# Patient Record
Sex: FEMALE | Race: WHITE | Employment: OTHER | URBAN - METROPOLITAN AREA
[De-identification: names, ages, dates, MRNs, and addresses within clinical notes are randomized per-mention and may not be internally consistent; named-entity substitution may affect disease eponyms.]

---

## 2017-02-09 ENCOUNTER — TRANSFERRED RECORDS (OUTPATIENT)
Dept: HEALTH INFORMATION MANAGEMENT | Facility: CLINIC | Age: 77
End: 2017-02-09

## 2017-02-14 ENCOUNTER — TELEPHONE (OUTPATIENT)
Dept: INTERNAL MEDICINE | Facility: CLINIC | Age: 77
End: 2017-02-14

## 2017-02-14 NOTE — TELEPHONE ENCOUNTER
Reason for Call:  question    Detailed comments: patient has questions regarding a stool sample she has to drop off.    Phone Number Patient can be reached at: Home number on file [phone] (home)    Best Time: anytime today    Can we leave a detailed message on this number? YES    Call taken on 2/14/2017 at 8:45 AM by Danielle Sanchez

## 2017-02-14 NOTE — TELEPHONE ENCOUNTER
"I called patient who reports she was seen in [facility] ER last Thursday for epigastric pressure, cardiac issue ruled out.   She was told she had gastroenteritis and has a stool cup for C.diff and wonders if she can bring sample if she is having diarrhea.  I advised her the sample has to be totally liquid.  She reports she only has loose stool off and on, usually when she eats out.    They put her on zantac and maalox in ER and she is feeling better.  No loose stool since last evening.  Denies abdominal pain or fever.  She states the off and on diarrhea has been going on \"for some time\".  Advised her she should follow up with [facility] to discuss her diagnosis and plan, hold off on stool sample as she is not having liquid stool.  Scheduled for Thursday.  Patient verbalized understanding of and agreement with plan.  Brigida Perez RN  [facility]      "

## 2017-02-16 ENCOUNTER — OFFICE VISIT (OUTPATIENT)
Dept: INTERNAL MEDICINE | Facility: CLINIC | Age: 77
End: 2017-02-16
Payer: MEDICARE

## 2017-02-16 VITALS
WEIGHT: 230 LBS | HEART RATE: 89 BPM | OXYGEN SATURATION: 94 % | BODY MASS INDEX: 34.86 KG/M2 | DIASTOLIC BLOOD PRESSURE: 72 MMHG | HEIGHT: 68 IN | SYSTOLIC BLOOD PRESSURE: 130 MMHG | TEMPERATURE: 97.1 F

## 2017-02-16 DIAGNOSIS — D86.9 SARCOIDOSIS: ICD-10-CM

## 2017-02-16 DIAGNOSIS — E66.812 OBESITY, CLASS II, BMI 35-39.9: ICD-10-CM

## 2017-02-16 DIAGNOSIS — Z12.11 COLON CANCER SCREENING: ICD-10-CM

## 2017-02-16 DIAGNOSIS — K63.5 COLON POLYP: ICD-10-CM

## 2017-02-16 DIAGNOSIS — E11.9 DIABETES MELLITUS WITHOUT COMPLICATION (H): ICD-10-CM

## 2017-02-16 DIAGNOSIS — G40.109 LOCALIZATION-RELATED FOCAL EPILEPSY WITH SIMPLE PARTIAL SEIZURES (H): ICD-10-CM

## 2017-02-16 DIAGNOSIS — E11.42 DIABETIC POLYNEUROPATHY ASSOCIATED WITH TYPE 2 DIABETES MELLITUS (H): Primary | ICD-10-CM

## 2017-02-16 DIAGNOSIS — I10 ESSENTIAL HYPERTENSION WITH GOAL BLOOD PRESSURE LESS THAN 140/90: Chronic | ICD-10-CM

## 2017-02-16 PROCEDURE — 99214 OFFICE O/P EST MOD 30 MIN: CPT | Performed by: INTERNAL MEDICINE

## 2017-02-16 ASSESSMENT — PAIN SCALES - GENERAL: PAINLEVEL: NO PAIN (0)

## 2017-02-16 NOTE — MR AVS SNAPSHOT
"              After Visit Summary   2/16/2017    Phyllis Aleman    MRN: [id number]           Patient Information     Date Of Birth          1940        Visit Information        Provider Department      2/16/2017 3:00 PM Johana Pelayo MD [facility]        Today's Diagnoses     Bowel habit changes    -  1    Colon polyp        overweight  HCC        Localization-related focal epilepsy with simple partial seizures (H)        Essential hypertension with goal blood pressure less than 140/90        Sarcoidosis quiet        Diabetes mellitus without complication (H)          Care Instructions    Return to clinic early/mid May 2017 with labs and mammogram prior              Follow-ups after your visit        Follow-up notes from your care team     Return in about 3 months (around 5/16/2017) for Routine Visit.      Who to contact     If you have questions or need follow up information about today's clinic visit or your schedule please contact [facility] directly at [phone].  Normal or non-critical lab and imaging results will be communicated to you by MyChart, letter or phone within 4 business days after the clinic has received the results. If you do not hear from us within 7 days, please contact the clinic through MyChart or phone. If you have a critical or abnormal lab result, we will notify you by phone as soon as possible.  Submit refill requests through [employer] or call your pharmacy and they will forward the refill request to us. Please allow 3 business days for your refill to be completed.          Additional Information About Your Visit        MyChart Information     [employer] lets you send messages to your doctor, view your test results, renew your prescriptions, schedule appointments and more. To sign up, go to [email or web address] . Click on \"Log in\" on the left side of the screen, which will take you to the Welcome page. Then click on \"Sign up " "Now\" on the right side of the page.     You will be asked to enter the access code listed below, as well as some personal information. Please follow the directions to create your username and password.     Your access code is: [email or web address]  Expires: 2017  2:37 PM     Your access code will  in 90 days. If you need help or a new code, please call your [place] clinic or [phone].        Care EveryWhere ID     This is your Care EveryWhere ID. This could be used by other organizations to access your [place] medical records  [id number]        Your Vitals Were     Pulse Temperature Height Pulse Oximetry Breastfeeding? BMI (Body Mass Index)    89 97.1  F (36.2  C) (Oral) 5' 7.5\" (1.715 m) 94% No 35.49 kg/m2       Blood Pressure from Last 3 Encounters:   17 130/72   16 125/67   16 136/67    Weight from Last 3 Encounters:   17 230 lb (104.3 kg)   16 232 lb (105.2 kg)   16 231 lb (104.8 kg)              Today, you had the following     No orders found for display       Primary Care Provider Office Phone # Fax #    Johana Pelayo [phone]       [facility] [address] [facility] [address]        Thank you!     Thank you for choosing [facility]  for your care. Our goal is always to provide you with excellent care. Hearing back from our patients is one way we can continue to improve our services. Please take a few minutes to complete the written survey that you may receive in the mail after your visit with us. Thank you!             Your Updated Medication List - Protect others around you: Learn how to safely use, store and throw away your medicines at www.disposemymeds.org.          This list is accurate as of: 17  3:43 PM.  Always use your most recent med list.                   Brand Name Dispense Instructions for use    aspirin 81 MG tablet      Take 1 tablet by mouth daily.       blood " glucose monitoring lancets     1 Box    Use to test blood sugar 1 times daily or as directed.       blood glucose monitoring test strip    ACCU-CHEK ANA PLUS    1 Box    Use to test blood sugar 1 times daily or as directed.       CENTRUM SILVER ULTRA WOMENS PO     30    Take  by mouth daily.       fish oil-omega-3 fatty acids 1000 MG capsule      Take 2 g by mouth every other day Reported on 2/16/2017       furosemide 40 MG tablet    LASIX    90 tablet    Take 1 tablet (40 mg) by mouth daily       lamoTRIgine 100 MG tablet    LaMICtal    180 tablet    Take 1 tablet (100 mg) by mouth 2 times daily       losartan 50 MG tablet    COZAAR    180 tablet    Take 1 tablet (50 mg) by mouth 2 times daily       metFORMIN 500 MG tablet    GLUCOPHAGE    270 tablet    Take 1 tablet (500 mg) by mouth 3 times daily (with meals) .       potassium chloride SA 20 MEQ CR tablet    K-DUR/KLOR-CON M    90 tablet    Take 1 tablet (20 mEq) by mouth daily Patient would like the K DUR, not the Klor Con due to cost!       simvastatin 20 MG tablet    ZOCOR    90 tablet    Take 1 tablet (20 mg) by mouth At Bedtime       ZANTAC 150 MG tablet   Generic drug:  ranitidine      Take  by mouth. As needed

## 2017-02-16 NOTE — NURSING NOTE
"Chief Complaint   Patient presents with     Hospital F/U     Diarrhea     Health Maintenance     colonoscopy        Initial /72 (BP Location: Right arm, Patient Position: Right side, Cuff Size: Adult Large)  Pulse 89  Temp 97.1  F (36.2  C) (Oral)  Ht 5' 7.5\" (1.715 m)  Wt 230 lb (104.3 kg)  SpO2 94%  Breastfeeding? No  BMI 35.49 kg/m2 Estimated body mass index is 35.49 kg/(m^2) as calculated from the following:    Height as of this encounter: 5' 7.5\" (1.715 m).    Weight as of this encounter: 230 lb (104.3 kg).  Medication Reconciliation: SMA Jeanette Gentile MA    "

## 2017-03-03 ENCOUNTER — TELEPHONE (OUTPATIENT)
Dept: INTERNAL MEDICINE | Facility: CLINIC | Age: 77
End: 2017-03-03

## 2017-03-03 NOTE — TELEPHONE ENCOUNTER
Panel Management Review      Patient has the following on her problem list:     Diabetes    ASA: Passed    Last A1C  Lab Results   Component Value Date    A1C 6.6 11/02/2016    A1C 6.6 06/28/2016    A1C 6.7 04/12/2016    A1C 6.9 10/20/2015    A1C 7.1 04/24/2015     A1C tested: Passed    Last LDL:    Lab Results   Component Value Date    CHOL 161 04/12/2016     Lab Results   Component Value Date    HDL 64 04/12/2016     Lab Results   Component Value Date    LDL 79 04/12/2016     Lab Results   Component Value Date    TRIG 91 04/12/2016     Lab Results   Component Value Date    CHOLHDLRATIO 2.7 04/24/2015     Lab Results   Component Value Date    NHDL 97 04/12/2016       Is the patient on a Statin? NO             Is the patient on Aspirin? YES    Medications     HMG CoA Reductase Inhibitors    simvastatin (ZOCOR) 20 MG tablet    Salicylates    aspirin 81 MG tablet          Last three blood pressure readings:  BP Readings from Last 3 Encounters:   02/16/17 130/72   11/29/16 125/67   06/28/16 136/67       Date of last diabetes office visit: 2/16/17     Tobacco History:     History   Smoking Status     Never Smoker   Smokeless Tobacco     Never Used         Hypertension   Last three blood pressure readings:  BP Readings from Last 3 Encounters:   02/16/17 130/72   11/29/16 125/67   06/28/16 136/67     Blood pressure: Passed    HTN Guidelines:  Age 18-59 BP range:  Less than 140/90  Age 60-85 with Diabetes:  Less than 140/90  Age 60-85 without Diabetes:  less than 150/90      Composite cancer screening  Chart review shows that this patient is due/due soon for the following Colonoscopy  Summary:    Patient is due/failing the following:   COLONOSCOPY    Action needed:   Referral was made on 2/16/17 for [place] [facility]    Type of outreach:    none closing chart    Questions for provider review:    None                                                                                                                                     Jeanette Holly ma       Chart routed to closing chart .

## 2017-03-28 DIAGNOSIS — G40.109 LOCALIZATION-RELATED FOCAL EPILEPSY WITH SIMPLE PARTIAL SEIZURES (H): ICD-10-CM

## 2017-03-28 NOTE — TELEPHONE ENCOUNTER
lamoTRIgine (LAMICTAL) 100 MG tablet      Last Written Prescription Date:  04/06/16  Last Fill Quantity: 180,   # refills: 3  Last Office Visit with [facility], [facility] or [facility] prescribing provider: 02/16/17  Future Office visit:    Next 5 appointments (look out 90 days)     Apr 19, 2017 10:00 AM CDT   Return Visit with Gen Whitaker MD   [facility] ([address] [address]   [phone]                   Routing refill request to provider for review/approval because:  Drug not on the [facility], [facility] or [facility] refill protocol or controlled substance

## 2017-03-31 RX ORDER — LAMOTRIGINE 100 MG/1
100 TABLET ORAL 2 TIMES DAILY
Qty: 60 TABLET | Refills: 0 | Status: SHIPPED | OUTPATIENT
Start: 2017-03-31 | End: 2017-04-19

## 2017-03-31 NOTE — TELEPHONE ENCOUNTER
Signed Prescriptions:                        Disp   Refills    lamoTRIgine (LAMICTAL) 100 MG tablet       60 tab*0        Sig: Take 1 tablet (100 mg) by mouth 2 times daily  Authorizing Provider: RERE KING  Ordering User: BETINA QUINTERO RN

## 2017-04-19 ENCOUNTER — OFFICE VISIT (OUTPATIENT)
Dept: NEUROLOGY | Facility: CLINIC | Age: 77
End: 2017-04-19
Payer: MEDICARE

## 2017-04-19 VITALS
WEIGHT: 227.4 LBS | SYSTOLIC BLOOD PRESSURE: 130 MMHG | HEART RATE: 83 BPM | DIASTOLIC BLOOD PRESSURE: 70 MMHG | HEIGHT: 68 IN | BODY MASS INDEX: 34.46 KG/M2

## 2017-04-19 DIAGNOSIS — G40.109 LOCALIZATION-RELATED FOCAL EPILEPSY WITH SIMPLE PARTIAL SEIZURES (H): Primary | ICD-10-CM

## 2017-04-19 PROCEDURE — 99213 OFFICE O/P EST LOW 20 MIN: CPT | Performed by: PSYCHIATRY & NEUROLOGY

## 2017-04-19 RX ORDER — LAMOTRIGINE 100 MG/1
100 TABLET ORAL 2 TIMES DAILY
Qty: 180 TABLET | Refills: 3 | Status: SHIPPED | OUTPATIENT
Start: 2017-04-19

## 2017-04-19 NOTE — MR AVS SNAPSHOT
After Visit Summary   4/19/2017    Phyllis Aleman    MRN: [id number]           Patient Information     Date Of Birth          1940        Visit Information        Provider Department      4/19/2017 10:00 AM Gen Whitaker MD [facility]        Today's Diagnoses     Localization-related focal epilepsy with simple partial seizures (H)    -  1    Localization-related focal epilepsy with simple partial seizures          Care Instructions      Future blood test before next appointment in 2018  Orders Placed This Encounter   Procedures     Lamotrigine level       *Please call [facility] near your home to have morning appointment for the blood draw.   Blood sample for seizure medication (TROUGH LEVEL), before the 1st dose in the morning                    Recent blood test results reviewed today   Reminded again:  To follow the seizure precautions including driving restrictions as per [place] Law including- to report a seizure and date it occurred within thirty (30) days of the episode or event     Importance of compliance with anti-seizure medication (s).      Documenting the frequency of the seizures                 To go to local hospital ER for acute emergencies/seizure management  and arrange office follow-up for further review of medications    To call us for follow-up appointment in next 1 year(s) or earlier if needed.    Thanks        Follow-ups after your visit        Follow-up notes from your care team     Return in about 1 year (around 4/19/2018).      Future tests that were ordered for you today     Open Future Orders        Priority Expected Expires Ordered    Lamotrigine level Routine 3/1/2018 4/19/2019 4/19/2017            Who to contact     If you have questions or need follow up information about today's clinic visit or your schedule please contact [facility] directly at [phone].  Normal or non-critical lab and imaging results will be  "communicated to you by [employer], letter or phone within 4 business days after the clinic has received the results. If you do not hear from us within 7 days, please contact the clinic through [employer] or phone. If you have a critical or abnormal lab result, we will notify you by phone as soon as possible.  Submit refill requests through [employer] or call your pharmacy and they will forward the refill request to us. Please allow 3 business days for your refill to be completed.          Additional Information About Your Visit        PrepClass Information     [employer] lets you send messages to your doctor, view your test results, renew your prescriptions, schedule appointments and more. To sign up, go to [email or web address] . Click on \"Log in\" on the left side of the screen, which will take you to the Welcome page. Then click on \"Sign up Now\" on the right side of the page.     You will be asked to enter the access code listed below, as well as some personal information. Please follow the directions to create your username and password.     Your access code is: [email or web address]  Expires: 2017 11:15 AM     Your access code will  in 90 days. If you need help or a new code, please call your [place] clinic or [phone].        Care EveryWhere ID     This is your Care EveryWhere ID. This could be used by other organizations to access your [place] medical records  [id number]        Your Vitals Were     Pulse Height BMI (Body Mass Index)             83 1.715 m (5' 7.5\") 35.09 kg/m2          Blood Pressure from Last 3 Encounters:   17 130/70   17 130/72   16 125/67    Weight from Last 3 Encounters:   17 103.1 kg (227 lb 6.4 oz)   17 104.3 kg (230 lb)   16 105.2 kg (232 lb)                 Where to get your medicines      These medications were sent to [facility] [id number] - [place], [place] - [address] AT [facility] [address]  [address] [place] [address]     Phone:  [phone]     lamoTRIgine 100 MG tablet          Primary Care Provider Office Phone # Fax #    Johana Pelayo -[phone]       [facility] [address]  [facility] [address]        Thank you!     Thank you for choosing [facility]  for your care. Our goal is always to provide you with excellent care. Hearing back from our patients is one way we can continue to improve our services. Please take a few minutes to complete the written survey that you may receive in the mail after your visit with us. Thank you!             Your Updated Medication List - Protect others around you: Learn how to safely use, store and throw away your medicines at www.disposemymeds.org.          This list is accurate as of: 4/19/17 11:15 AM.  Always use your most recent med list.                   Brand Name Dispense Instructions for use    aspirin 81 MG tablet      Take 1 tablet by mouth daily.       blood glucose monitoring lancets     1 Box    Use to test blood sugar 1 times daily or as directed.       blood glucose monitoring test strip    ACCU-CHEK ANA PLUS    1 Box    Use to test blood sugar 1 times daily or as directed.       CENTRUM SILVER ULTRA WOMENS PO     30    Take  by mouth daily.       fish oil-omega-3 fatty acids 1000 MG capsule      Take 2 g by mouth every other day Reported on 2/16/2017       furosemide 40 MG tablet    LASIX    90 tablet    Take 1 tablet (40 mg) by mouth daily       lamoTRIgine 100 MG tablet    LaMICtal    180 tablet    Take 1 tablet (100 mg) by mouth 2 times daily       losartan 50 MG tablet    COZAAR    180 tablet    Take 1 tablet (50 mg) by mouth 2 times daily       metFORMIN 500 MG tablet    GLUCOPHAGE    270 tablet    Take 1 tablet (500 mg) by mouth 3 times daily (with meals) .       potassium chloride SA 20 MEQ CR tablet    K-DUR/KLOR-CON M    90 tablet    Take 1 tablet (20 mEq) by mouth daily Patient  would like the SUREKHA GILLIS, not the Klor Con due to cost!       simvastatin 20 MG tablet    ZOCOR    90 tablet    Take 1 tablet (20 mg) by mouth At Bedtime       ZANTAC 150 MG tablet   Generic drug:  ranitidine      Take  by mouth. As needed

## 2017-04-19 NOTE — PATIENT INSTRUCTIONS
Future blood test before next appointment in 2018  Orders Placed This Encounter   Procedures     Lamotrigine level       *Please call [facility] near your home to have morning appointment for the blood draw.   Blood sample for seizure medication (TROUGH LEVEL), before the 1st dose in the morning                    Recent blood test results reviewed today   Reminded again:  To follow the seizure precautions including driving restrictions as per [place] Law including- to report a seizure and date it occurred within thirty (30) days of the episode or event     Importance of compliance with anti-seizure medication (s).      Documenting the frequency of the seizures                 To go to local hospital ER for acute emergencies/seizure management  and arrange office follow-up for further review of medications    To call us for follow-up appointment in next 1 year(s) or earlier if needed.    Thanks

## 2017-04-19 NOTE — NURSING NOTE
"Chief Complaint   Patient presents with     RECHECK     Seizure       Initial /70 (BP Location: Right arm, Patient Position: Chair, Cuff Size: Adult Large)  Pulse 83  Ht 5' 7.5\" (1.715 m)  Wt 227 lb 6.4 oz (103.1 kg)  BMI 35.09 kg/m2 Estimated body mass index is 35.09 kg/(m^2) as calculated from the following:    Height as of this encounter: 5' 7.5\" (1.715 m).    Weight as of this encounter: 227 lb 6.4 oz (103.1 kg).  Medication Reconciliation: complete   Deann Pal MA      "

## 2017-04-19 NOTE — PROGRESS NOTES
ESTABLISHED PATIENT NEUROLOGY NOTE    LOCATION: [facility]   DATE OF VISIT: 2017  NAME: Ms.Merry CORTES Aleman  : 1940 (76 year old)  MR #: [id number]    PRIMARY PROVIDER: Johana Pelayo MD    REASON FOR VISIT: Review of symptoms    HISTORY OF PRESENT ILLNESS: Ms. Phyllis Aleman is a 75-year-old woman with previous history of spells for more than 3 years.   Doing good on Lamotrigine.   Her last seizure was in .   No recurrence of seizures.  She has been compliant with lamotrigine.    Keeping physically and mentally active.   Blood tests:    Component      Latest Ref Rng & Units 2016   Lamotrigine Level       5.3     CURRENT MEDICATIONS:   Current Outpatient Prescriptions on File Prior to Visit:  lamoTRIgine (LAMICTAL) 100 MG tablet Take 1 tablet (100 mg) by mouth 2 times daily   potassium chloride SA (K-DUR,KLOR-CON M) 20 MEQ tablet Take 1 tablet (20 mEq) by mouth daily Patient would like the K DUR, not the Klor Con due to cost!   furosemide (LASIX) 40 MG tablet Take 1 tablet (40 mg) by mouth daily   losartan (COZAAR) 50 MG tablet Take 1 tablet (50 mg) by mouth 2 times daily   metFORMIN (GLUCOPHAGE) 500 MG tablet Take 1 tablet (500 mg) by mouth 3 times daily (with meals) .   blood glucose monitoring (ACCU-CHEK ANA PLUS) test strip Use to test blood sugar 1 times daily or as directed.   simvastatin (ZOCOR) 20 MG tablet Take 1 tablet (20 mg) by mouth At Bedtime   blood glucose monitoring (ACCU-CHEK FASTCLIX) lancets Use to test blood sugar 1 times daily or as directed.   ranitidine (ZANTAC) 150 MG tablet Take  by mouth. As needed   fish oil-omega-3 fatty acids (FISH OIL) 1000 MG capsule Take 2 g by mouth every other day Reported on 2017   aspirin 81 MG tablet Take 1 tablet by mouth daily.   Multiple Vitamins-Minerals (CENTRUM SILVER ULTRA WOMENS PO) Take  by mouth daily.     No current facility-administered medications on file prior  "to visit.   PAST MEDICAL HISTORY: Past Medical History:   Diagnosis Date     Arthritis      Cancer (H)      Cataract      Colon polyp 4/8/2010    Due for colonoscopy in 2011      Diabetes mellitus without complication (H) 10/24/2015     Diabetes mellitus without mention of complication      Dyslipidemia      Edema of extremities 5/12/2014     Endolymphatic hydrops 9/26/2012     Gallstones 2003     HTN      Hyperlipidemia LDL goal <100 11/29/2011     Localization-related focal epilepsy with simple partial seizures (H) 7/3/2014     Meniere's disease      Menopause      overweight 10/27/2015     PONV (postoperative nausea and vomiting)      Pseudophakia OU 10/18/2010     PVD (posterior vitreous detachment), bilateral 6/29/2015     Sarcoidosis (H) 1996    stable bilateral hilar adenopathy on yearly CXRs     Past Surgical, Personal & Social history reviewed & documented in the EPIC.    GENERAL EXAMINATION:  /70 (BP Location: Right arm, Patient Position: Chair, Cuff Size: Adult Large)  Pulse 83  Ht 1.715 m (5' 7.5\")  Wt 103.1 kg (227 lb 6.4 oz)  BMI 35.09 kg/m2    IMPRESSION:  Encounter Diagnoses   Name Primary?     Localization-related focal epilepsy with simple partial seizures (H) Yes   PLANS:   Patient Instructions       Future blood test before next appointment in 2018  Orders Placed This Encounter   Procedures     Lamotrigine level       *Please call [facility] near your home to have morning appointment for the blood draw.   Blood sample for seizure medication (TROUGH LEVEL), before the 1st dose in the morning                    Recent blood test results reviewed today   Reminded again:  To follow the seizure precautions including driving restrictions as per [place] Law including- to report a seizure and date it occurred within thirty (30) days of the episode or event     Importance of compliance with anti-seizure medication (s).      Documenting the frequency of the seizures                 To go to " local hospital ER for acute emergencies/seizure management  and arrange office follow-up for further review of medications    To call us for follow-up appointment in next 1 year(s) or earlier if needed.    Thanks to Johana Pelayo MD for allowing me to participate in Ms. Aleman's care. Please feel free to call me with any questions or concerns.         Gen Whitaker MD, [facility]  Neurologist    Cc: Johana Pelayo MD

## 2017-06-01 ENCOUNTER — TELEPHONE (OUTPATIENT)
Dept: FAMILY MEDICINE | Facility: CLINIC | Age: 77
End: 2017-06-01

## 2017-06-01 DIAGNOSIS — E11.9 DIABETES MELLITUS WITHOUT COMPLICATION (H): ICD-10-CM

## 2017-06-01 DIAGNOSIS — E78.5 HYPERLIPIDEMIA LDL GOAL <100: ICD-10-CM

## 2017-06-01 RX ORDER — SIMVASTATIN 20 MG
20 TABLET ORAL AT BEDTIME
Qty: 30 TABLET | Refills: 0 | Status: SHIPPED | OUTPATIENT
Start: 2017-06-01 | End: 2017-06-22

## 2017-06-01 NOTE — TELEPHONE ENCOUNTER
Patient last seen 2/16/17, see follow up plan:    Patient Instructions   Return to clinic early/mid May 2017 with labs and mammogram prior        Due for FLP.  Has future lab orders; I don't see order for mammogram, last done in 2015.    Medication is being filled for 1 time refill only due to:  Patient needs to be seen because due for fasting labs and visit per plan.     Brigida Perez RN  [facility]

## 2017-06-01 NOTE — TELEPHONE ENCOUNTER
Received call from [facility] stating that they have been incorrectly sending patient's refill request to the wrong provider. Patient is completely out of medication. Will route as patient calls to address refill request.  simvastatin (ZOCOR) 20 MG tablet     Last Written Prescription Date: 06/28/2016  Last Fill Quantity: 90, # refills: 3  Last Office Visit with [facility], [facility] or [facility] prescribing provider: 02/16/2017       Lab Results   Component Value Date    CHOL 161 04/12/2016     Lab Results   Component Value Date    HDL 64 04/12/2016     Lab Results   Component Value Date    LDL 79 04/12/2016     Lab Results   Component Value Date    TRIG 91 04/12/2016     Lab Results   Component Value Date    CHOLHDLRATIO 2.7 04/24/2015

## 2017-06-19 ENCOUNTER — TELEPHONE (OUTPATIENT)
Dept: NEUROLOGY | Facility: CLINIC | Age: 77
End: 2017-06-19

## 2017-06-19 NOTE — TELEPHONE ENCOUNTER
Reason for Call: Request for an order or referral:    Order or referral being requested: Patient requesting new lab order    Date needed: as soon as possible    Has the patient been seen by the PCP for this problem? YES    Additional comments: Na    Phone number Patient can be reached at:  Home number on file [phone] (home)    Best Time:  anytime    Can we leave a detailed message on this number?  YES    Call taken on 6/19/2017 at 3:26 PM by Payal Long

## 2017-06-20 NOTE — TELEPHONE ENCOUNTER
Called and left patient a message to call me back at clinic, if patient calls please philip me so, I can speak with her  Deann Pal MA

## 2017-06-20 NOTE — TELEPHONE ENCOUNTER
Patient called back and I explained lab orders have been entered, patient says she calls and was told lab order were no longer in the computer. I explained they are entered but, when she goes to have lab work done if she can remind them to release future orders.  Deann Pal MA

## 2017-06-21 NOTE — PATIENT INSTRUCTIONS

## 2017-06-21 NOTE — PROGRESS NOTES
SUBJECTIVE:                                                            Phyllis Aleamn is a 76 year old female who presents for Preventive Visit.    77 y/o WF with uncomplicated DM II, quiet sarcoidosis here for AFE.  Colonoscopy due, will do a FIT test though..  on Lamictal for seizures, and has had no symptoms.     Are you in the first 12 months of your Medicare Part B coverage?  No    Healthy Habits:    Do you get at least three servings of calcium containing foods daily (dairy, green leafy vegetables, etc.)? yes    Amount of exercise or daily activities, outside of work: 3 day(s) per week    Problems taking medications regularly No    Medication side effects: No    Have you had an eye exam in the past two years? yes    Do you see a dentist twice per year? yes    Do you have sleep apnea, excessive snoring or daytime drowsiness?no    COGNITIVE SCREEN  1) Repeat 3 items (Banana, Sunrise, Chair)    2) Clock draw: NORMAL  3) 3 item recall: Recalls 3 objects  Results: 3 items recalled: COGNITIVE IMPAIRMENT LESS LIKELY    Mini-CogTM Copyright VIKKI Ogden. Licensed by the author for use in [facility]; reprinted with permission (sokota@[email or web address]). All rights reserved.    No concerns     Reviewed and updated as needed this visit by clinical staff         Reviewed and updated as needed this visit by Provider        Social History   Substance Use Topics     Smoking status: Never Smoker     Smokeless tobacco: Never Used     Alcohol use 0.0 oz/week     0 Standard drinks or equivalent per week      Comment: 1 drink a month, wine or deidra       The patient does not drink >3 drinks per day nor >7 drinks per week.    Today's PHQ-2 Score:   PHQ-2 ( 1999 [employer]) 2/16/2017 6/28/2016   Q1: Little interest or pleasure in doing things 0 0   Q2: Feeling down, depressed or hopeless 0 1   PHQ-2 Score 0 1     PHQ 2 = 0  Do you feel safe in your environment - Yes    Do you have a Health Care Directive?: Yes: Advance  Directive has been received and scanned.    Current providers sharing in care for this patient include:   Patient Care Team:  Johana Pelayo MD as PCP - General      Hearing impairment: Yes, has hearing aids little hearing in her left ear     Ability to successfully perform activities of daily living: Yes, no assistance needed     Fall risk:       Home safety:  none identified      The following health maintenance items are reviewed in Epic and correct as of today:  Health Maintenance   Topic Date Due     MEDICARE ANNUAL WELLNESS VISIT  12/22/1958     COLONOSCOPY Q5 YR  02/21/2011     LIPID MONITORING Q1 YEAR  04/12/2017     A1C Q6 MO  05/02/2017     FALL RISK ASSESSMENT  06/28/2017     INFLUENZA VACCINE (SYSTEM ASSIGNED)  09/01/2017     BMP Q1 YR  11/02/2017     MICROALBUMIN Q1 YEAR  11/02/2017     EYE EXAM Q1 YEAR  11/21/2017     FOOT EXAM Q1 YEAR  11/29/2017     TSH W/ FREE T4 REFLEX Q2 YEAR  11/02/2018     DEXA Q3 YR  11/16/2019     ADVANCE DIRECTIVE PLANNING Q5 YRS  10/07/2021     TETANUS IMMUNIZATION (SYSTEM ASSIGNED)  10/01/2022     PNEUMOCOCCAL  Completed              ROS:  Constitutional, HEENT, cardiovascular, pulmonary, GI, , musculoskeletal, neuro, skin, endocrine and psych systems are negative, except as otherwise noted.    Problem list, Medication list, Allergies, and Medical/Social/Surgical histories reviewed in EPIC and updated as appropriate.  Labs reviewed in EPIC  BP Readings from Last 3 Encounters:   06/22/17 131/68   04/19/17 130/70   02/16/17 130/72    Wt Readings from Last 3 Encounters:   06/22/17 227 lb (103 kg)   04/19/17 227 lb 6.4 oz (103.1 kg)   02/16/17 230 lb (104.3 kg)                  Patient Active Problem List   Diagnosis     Colon polyp     Pseudophakia OU     Sarcoidosis quiet     Advanced directives, counseling/discussion     Hyperlipidemia LDL goal <100     Endolymphatic hydrops     Edema of extremities     Localization-related focal epilepsy with simple partial seizures  (H)     PVD (posterior vitreous detachment), bilateral     Diabetes mellitus without complication (H)     overweight  HCC     Essential hypertension with goal blood pressure less than 140/90     Choroidal nevus of left eye     Diabetic polyneuropathy associated with type 2 diabetes mellitus (H)     Nail dystrophy     Past Surgical History:   Procedure Laterality Date     BIOPSY  1997    For sarcoidosis.      C REMOVAL GALLBLADDER  2003     CATARACT IOL, RT/LT  2003, 2006    right, left - Dr. Cuevas     COLONOSCOPY       TONSILLECTOMY         Social History   Substance Use Topics     Smoking status: Never Smoker     Smokeless tobacco: Never Used     Alcohol use 0.0 oz/week     0 Standard drinks or equivalent per week      Comment: 1 drink a month or less, wine or deidra     Family History   Problem Relation Age of Onset     DIABETES Mother      HEART DISEASE Father      Hypertension Son          Current Outpatient Prescriptions   Medication Sig Dispense Refill     simvastatin (ZOCOR) 20 MG tablet Take 1 tablet (20 mg) by mouth At Bedtime 6/1/17: due for fasting labs and follow up visit 30 tablet 0     lamoTRIgine (LAMICTAL) 100 MG tablet Take 1 tablet (100 mg) by mouth 2 times daily 180 tablet 3     potassium chloride SA (K-DUR,KLOR-CON M) 20 MEQ tablet Take 1 tablet (20 mEq) by mouth daily Patient would like the K DUR, not the Klor Con due to cost! 90 tablet 3     furosemide (LASIX) 40 MG tablet Take 1 tablet (40 mg) by mouth daily 90 tablet 3     losartan (COZAAR) 50 MG tablet Take 1 tablet (50 mg) by mouth 2 times daily 180 tablet 3     metFORMIN (GLUCOPHAGE) 500 MG tablet Take 1 tablet (500 mg) by mouth 3 times daily (with meals) . 270 tablet 3     blood glucose monitoring (ACCU-CHEK ANA PLUS) test strip Use to test blood sugar 1 times daily or as directed. 1 Box 12     blood glucose monitoring (ACCU-CHEK FASTCLIX) lancets Use to test blood sugar 1 times daily or as directed. 1 Box 12     ranitidine (ZANTAC)  "150 MG tablet Take  by mouth. As needed       fish oil-omega-3 fatty acids (FISH OIL) 1000 MG capsule Take 2 g by mouth every other day Reported on 2/16/2017       aspirin 81 MG tablet Take 1 tablet by mouth daily.  3     Multiple Vitamins-Minerals (CENTRUM SILVER ULTRA WOMENS PO) Take  by mouth daily. 30 0     Allergies   Allergen Reactions     Latex      Penicillins      Recent Labs   Lab Test  06/22/17   0658  11/02/16   0731  06/28/16   0905  04/12/16   0759   04/24/15   0802  10/06/14   0746   09/30/13   0724   A1C  6.7*  6.6*  6.6*  6.7*   < >  7.1*  7.1*   < >  6.7*   LDL   --    --    --   79   --   73  75   --   78   HDL   --    --    --   64   --   58  65   --   51   TRIG   --    --    --   91   --   125  105   --   92   ALT   --    --    --    --    --   29  31   --   28   CR   --   0.83  0.84   --    < >  0.90  0.85   < >  0.74   GFRESTIMATED   --   67  66   --    < >  61  66   < >  77   GFRESTBLACK   --   81  80   --    < >  74  80   < >  >90   POTASSIUM   --   4.5  4.5   --    < >  4.3  4.1   < >  4.6   TSH   --   1.36   --    --    --   0.96   --    --    --     < > = values in this interval not displayed.      OBJECTIVE:                                                            /68 (BP Location: Left arm, Patient Position: Chair, Cuff Size: Adult Large)  Pulse 87  Temp 97.2  F (36.2  C) (Oral)  Ht 5' 7.5\" (1.715 m)  Wt 227 lb (103 kg)  SpO2 94%  BMI 35.03 kg/m2 Estimated body mass index is 35.09 kg/(m^2) as calculated from the following:    Height as of 4/19/17: 5' 7.5\" (1.715 m).    Weight as of 4/19/17: 227 lb 6.4 oz (103.1 kg).  EXAM:   GENERAL APPEARANCE: healthy, alert and no distress  EYES: Eyes grossly normal to inspection, PERRL and conjunctivae and sclerae normal  HENT: ear canals and TM's normal, nose and mouth without ulcers or lesions, oropharynx clear and oral mucous membranes moist  NECK: no adenopathy, no asymmetry, masses, or scars and thyroid normal to palpation  RESP: " lungs clear to auscultation - no rales, rhonchi or wheezes  BREAST: normal without masses, tenderness or nipple discharge and no palpable axillary masses or adenopathy  CV: regular rate and rhythm, normal S1 S2, no S3 or S4, no murmur, click or rub, no peripheral edema and peripheral pulses strong  ABDOMEN: soft, nontender, no hepatosplenomegaly, no masses and bowel sounds normal   (female): normal female external genitalia, normal urethral meatus, vaginal mucosal atrophy noted, normal cervix, adnexae, and uterus without masses or abnormal discharge  Bimanual only     MS: no musculoskeletal defects are noted and gait is age appropriate without ataxia  SKIN: no suspicious lesions or rashes  NEURO: Normal strength and tone, sensory exam grossly normal, mentation intact and speech normal  PSYCH: mentation appears normal and affect normal/bright    ASSESSMENT / PLAN:                                                                ICD-10-CM    1. Routine general medical examination at a health care facility Z00.00    2. Type 2 diabetes mellitus without complication, without long-term current use of insulin (H) E11.9 losartan (COZAAR) 50 MG tablet     metFORMIN (GLUCOPHAGE) 500 MG tablet     blood glucose monitoring (ACCU-CHEK ANA PLUS) test strip     blood glucose monitoring (ACCU-CHEK FASTCLIX) lancets   3. Localization-related focal epilepsy with simple partial seizures (H) G40.109 CBC with platelets differential   4. overweight  HCC E66.01    5. Sarcoidosis quiet D86.9    6. Essential hypertension with goal blood pressure less than 140/90 I10 potassium chloride SA (K-DUR/KLOR-CON M) 20 MEQ CR tablet     losartan (COZAAR) 50 MG tablet   7. Hyperlipidemia LDL goal <100 E78.5 simvastatin (ZOCOR) 20 MG tablet   8. Edema of extremities R60.0 potassium chloride SA (K-DUR/KLOR-CON M) 20 MEQ CR tablet     furosemide (LASIX) 40 MG tablet        patient taking 2 potassiums daily, Rx says one daily... Await those results and  "advise. -- will have her do one tab (20 meq ) daily .   DM is controlled, A1C today is 6.7    End of Life Planning:  Patient currently has an advanced directive: Yes.  Practitioner is supportive of decision.    COUNSELING:  Reviewed preventive health counseling, as reflected in patient instructions        Estimated body mass index is 35.09 kg/(m^2) as calculated from the following:    Height as of 4/19/17: 5' 7.5\" (1.715 m).    Weight as of 4/19/17: 227 lb 6.4 oz (103.1 kg).  Weight management plan: she lost 25# over past couple years and maintained this.    reports that she has never smoked. She has never used smokeless tobacco.      Appropriate preventive services were discussed with this patient, including applicable screening as appropriate for cardiovascular disease, diabetes, osteopenia/osteoporosis, and glaucoma.  As appropriate for age/gender, discussed screening for colorectal cancer, prostate cancer, breast cancer, and cervical cancer. Checklist reviewing preventive services available has been given to the patient.    Reviewed patients plan of care and provided an AVS. The Basic Care Plan (routine screening as documented in Health Maintenance) for Phyllis meets the Care Plan requirement. This Care Plan has been established and reviewed with the Patient.    Counseling Resources:  ATP IV Guidelines  Pooled Cohorts Equation Calculator  Breast Cancer Risk Calculator  FRAX Risk Assessment  ICSI Preventive Guidelines  Dietary Guidelines for Americans, 2010  USDA's MyPlate  ASA Prophylaxis  Lung CA Screening    Johana Pelayo MD  [facility]  "

## 2017-06-22 ENCOUNTER — OFFICE VISIT (OUTPATIENT)
Dept: FAMILY MEDICINE | Facility: CLINIC | Age: 77
End: 2017-06-22
Payer: MEDICARE

## 2017-06-22 VITALS
WEIGHT: 227 LBS | BODY MASS INDEX: 34.4 KG/M2 | TEMPERATURE: 97.2 F | DIASTOLIC BLOOD PRESSURE: 68 MMHG | HEART RATE: 87 BPM | OXYGEN SATURATION: 94 % | SYSTOLIC BLOOD PRESSURE: 131 MMHG | HEIGHT: 68 IN

## 2017-06-22 DIAGNOSIS — E11.42 DIABETIC POLYNEUROPATHY ASSOCIATED WITH TYPE 2 DIABETES MELLITUS (H): ICD-10-CM

## 2017-06-22 DIAGNOSIS — E11.9 TYPE 2 DIABETES MELLITUS WITHOUT COMPLICATION, WITHOUT LONG-TERM CURRENT USE OF INSULIN (H): ICD-10-CM

## 2017-06-22 DIAGNOSIS — L60.3 NAIL DYSTROPHY: ICD-10-CM

## 2017-06-22 DIAGNOSIS — D86.9 SARCOIDOSIS: ICD-10-CM

## 2017-06-22 DIAGNOSIS — I10 ESSENTIAL HYPERTENSION WITH GOAL BLOOD PRESSURE LESS THAN 140/90: Chronic | ICD-10-CM

## 2017-06-22 DIAGNOSIS — E66.812 OBESITY, CLASS II, BMI 35-39.9: ICD-10-CM

## 2017-06-22 DIAGNOSIS — Z00.00 ROUTINE GENERAL MEDICAL EXAMINATION AT A HEALTH CARE FACILITY: Primary | ICD-10-CM

## 2017-06-22 DIAGNOSIS — E11.9 DIABETES MELLITUS WITHOUT COMPLICATION (H): ICD-10-CM

## 2017-06-22 DIAGNOSIS — G40.109 LOCALIZATION-RELATED FOCAL EPILEPSY WITH SIMPLE PARTIAL SEIZURES (H): ICD-10-CM

## 2017-06-22 DIAGNOSIS — E78.5 HYPERLIPIDEMIA LDL GOAL <100: ICD-10-CM

## 2017-06-22 DIAGNOSIS — R60.0 EDEMA OF EXTREMITIES: ICD-10-CM

## 2017-06-22 LAB
ALT SERPL W P-5'-P-CCNC: 27 U/L (ref 0–50)
ANION GAP SERPL CALCULATED.3IONS-SCNC: 9 MMOL/L (ref 3–14)
AST SERPL W P-5'-P-CCNC: 16 U/L (ref 0–45)
BASOPHILS # BLD AUTO: 0.1 10E9/L (ref 0–0.2)
BASOPHILS NFR BLD AUTO: 0.7 %
BUN SERPL-MCNC: 18 MG/DL (ref 7–30)
CALCIUM SERPL-MCNC: 9.7 MG/DL (ref 8.5–10.1)
CHLORIDE SERPL-SCNC: 98 MMOL/L (ref 94–109)
CHOLEST SERPL-MCNC: 165 MG/DL
CK SERPL-CCNC: 60 U/L (ref 30–225)
CO2 SERPL-SCNC: 29 MMOL/L (ref 20–32)
CREAT SERPL-MCNC: 0.84 MG/DL (ref 0.52–1.04)
DIFFERENTIAL METHOD BLD: NORMAL
EOSINOPHIL # BLD AUTO: 0.3 10E9/L (ref 0–0.7)
EOSINOPHIL NFR BLD AUTO: 3.1 %
ERYTHROCYTE [DISTWIDTH] IN BLOOD BY AUTOMATED COUNT: 14.8 % (ref 10–15)
GFR SERPL CREATININE-BSD FRML MDRD: 66 ML/MIN/1.7M2
GLUCOSE SERPL-MCNC: 143 MG/DL (ref 70–99)
HBA1C MFR BLD: 6.7 % (ref 4.3–6)
HCT VFR BLD AUTO: 37.6 % (ref 35–47)
HDLC SERPL-MCNC: 62 MG/DL
HGB BLD-MCNC: 12.4 G/DL (ref 11.7–15.7)
LDLC SERPL CALC-MCNC: 83 MG/DL
LYMPHOCYTES # BLD AUTO: 1.4 10E9/L (ref 0.8–5.3)
LYMPHOCYTES NFR BLD AUTO: 16.5 %
MCH RBC QN AUTO: 29.8 PG (ref 26.5–33)
MCHC RBC AUTO-ENTMCNC: 33 G/DL (ref 31.5–36.5)
MCV RBC AUTO: 90 FL (ref 78–100)
MONOCYTES # BLD AUTO: 0.9 10E9/L (ref 0–1.3)
MONOCYTES NFR BLD AUTO: 11.2 %
NEUTROPHILS # BLD AUTO: 5.7 10E9/L (ref 1.6–8.3)
NEUTROPHILS NFR BLD AUTO: 68.5 %
NONHDLC SERPL-MCNC: 103 MG/DL
PLATELET # BLD AUTO: 357 10E9/L (ref 150–450)
POTASSIUM SERPL-SCNC: 4.4 MMOL/L (ref 3.4–5.3)
RBC # BLD AUTO: 4.16 10E12/L (ref 3.8–5.2)
SODIUM SERPL-SCNC: 136 MMOL/L (ref 133–144)
TRIGL SERPL-MCNC: 99 MG/DL
TSH SERPL DL<=0.005 MIU/L-ACNC: 1.09 MU/L (ref 0.4–4)
WBC # BLD AUTO: 8.3 10E9/L (ref 4–11)

## 2017-06-22 PROCEDURE — 80175 DRUG SCREEN QUAN LAMOTRIGINE: CPT | Mod: 90 | Performed by: INTERNAL MEDICINE

## 2017-06-22 PROCEDURE — 82550 ASSAY OF CK (CPK): CPT | Performed by: INTERNAL MEDICINE

## 2017-06-22 PROCEDURE — 84460 ALANINE AMINO (ALT) (SGPT): CPT | Performed by: INTERNAL MEDICINE

## 2017-06-22 PROCEDURE — 84443 ASSAY THYROID STIM HORMONE: CPT | Performed by: INTERNAL MEDICINE

## 2017-06-22 PROCEDURE — 99000 SPECIMEN HANDLING OFFICE-LAB: CPT | Performed by: INTERNAL MEDICINE

## 2017-06-22 PROCEDURE — 83036 HEMOGLOBIN GLYCOSYLATED A1C: CPT | Performed by: INTERNAL MEDICINE

## 2017-06-22 PROCEDURE — 80061 LIPID PANEL: CPT | Performed by: INTERNAL MEDICINE

## 2017-06-22 PROCEDURE — 36415 COLL VENOUS BLD VENIPUNCTURE: CPT | Performed by: INTERNAL MEDICINE

## 2017-06-22 PROCEDURE — 84450 TRANSFERASE (AST) (SGOT): CPT | Performed by: INTERNAL MEDICINE

## 2017-06-22 PROCEDURE — 85025 COMPLETE CBC W/AUTO DIFF WBC: CPT | Performed by: INTERNAL MEDICINE

## 2017-06-22 PROCEDURE — G0439 PPPS, SUBSEQ VISIT: HCPCS | Performed by: INTERNAL MEDICINE

## 2017-06-22 PROCEDURE — 99213 OFFICE O/P EST LOW 20 MIN: CPT | Mod: 25 | Performed by: INTERNAL MEDICINE

## 2017-06-22 PROCEDURE — 80048 BASIC METABOLIC PNL TOTAL CA: CPT | Performed by: INTERNAL MEDICINE

## 2017-06-22 RX ORDER — POTASSIUM CHLORIDE 1500 MG/1
20 TABLET, EXTENDED RELEASE ORAL DAILY
Qty: 90 TABLET | Refills: 3 | Status: SHIPPED | OUTPATIENT
Start: 2017-06-22 | End: 2018-07-30

## 2017-06-22 RX ORDER — FUROSEMIDE 40 MG
40 TABLET ORAL DAILY
Qty: 90 TABLET | Refills: 3 | Status: SHIPPED | OUTPATIENT
Start: 2017-06-22 | End: 2018-07-03

## 2017-06-22 RX ORDER — LOSARTAN POTASSIUM 50 MG/1
50 TABLET ORAL
Qty: 180 TABLET | Refills: 3 | Status: SHIPPED | OUTPATIENT
Start: 2017-06-22 | End: 2018-07-02

## 2017-06-22 RX ORDER — LANCETS
EACH MISCELLANEOUS
Qty: 100 EACH | Refills: 3 | Status: SHIPPED | OUTPATIENT
Start: 2017-06-22

## 2017-06-22 RX ORDER — SIMVASTATIN 20 MG
20 TABLET ORAL AT BEDTIME
Qty: 90 TABLET | Refills: 3 | Status: SHIPPED | OUTPATIENT
Start: 2017-06-22 | End: 2018-05-31

## 2017-06-22 NOTE — LETTER
[facility]  [address] [place]  [address]  [phone]        June 23, 2017    Phyllis Aleman  [address] [place], [place] [id number]  [facility] [address]        Dear Phyllis,    Your blood count is perfect.     Results for orders placed or performed in visit on 06/22/17   CBC with platelets differential   Result Value Ref Range    WBC 8.3 4.0 - 11.0 10e9/L    RBC Count 4.16 3.8 - 5.2 10e12/L    Hemoglobin 12.4 11.7 - 15.7 g/dL    Hematocrit 37.6 35.0 - 47.0 %    MCV 90 78 - 100 fl    MCH 29.8 26.5 - 33.0 pg    MCHC 33.0 31.5 - 36.5 g/dL    RDW 14.8 10.0 - 15.0 %    Platelet Count 357 150 - 450 10e9/L    Diff Method Automated Method     % Neutrophils 68.5 %    % Lymphocytes 16.5 %    % Monocytes 11.2 %    % Eosinophils 3.1 %    % Basophils 0.7 %    Absolute Neutrophil 5.7 1.6 - 8.3 10e9/L    Absolute Lymphocytes 1.4 0.8 - 5.3 10e9/L    Absolute Monocytes 0.9 0.0 - 1.3 10e9/L    Absolute Eosinophils 0.3 0.0 - 0.7 10e9/L    Absolute Basophils 0.1 0.0 - 0.2 10e9/L     If you have any questions please call the clinic at [phone].    Sincerely,    Johana CORLEY

## 2017-06-22 NOTE — LETTER
[facility]  [address] [place]  [address]  [phone]        June 23, 2017    Phyllis Aleman  [address] [place] [id number]        Dear Phyllis,    Enclosed are your results.  Your labs are normal/stable at this time.   You only need to take one of the potassium tabs daily.     Please call  with any questions.  We can also discuss any questions regarding these labs at your next scheduled visit.    Results for orders placed or performed in visit on 06/22/17   Lipid panel reflex to direct LDL   Result Value Ref Range    Cholesterol 165 <200 mg/dL    Triglycerides 99 <150 mg/dL    HDL Cholesterol 62 >49 mg/dL    LDL Cholesterol Calculated 83 <100 mg/dL    Non HDL Cholesterol 103 <130 mg/dL   Hemoglobin A1c   Result Value Ref Range    Hemoglobin A1C 6.7 (H) 4.3 - 6.0 %   AST   Result Value Ref Range    AST 16 0 - 45 U/L   ALT   Result Value Ref Range    ALT 27 0 - 50 U/L   CK total   Result Value Ref Range    CK Total 60 30 - 225 U/L   Basic metabolic panel   Result Value Ref Range    Sodium 136 133 - 144 mmol/L    Potassium 4.4 3.4 - 5.3 mmol/L    Chloride 98 94 - 109 mmol/L    Carbon Dioxide 29 20 - 32 mmol/L    Anion Gap 9 3 - 14 mmol/L    Glucose 143 (H) 70 - 99 mg/dL    Urea Nitrogen 18 7 - 30 mg/dL    Creatinine 0.84 0.52 - 1.04 mg/dL    GFR Estimate 66 >60 mL/min/1.7m2    GFR Estimate If Black 79 >60 mL/min/1.7m2    Calcium 9.7 8.5 - 10.1 mg/dL   TSH with free T4 reflex   Result Value Ref Range    TSH 1.09 0.40 - 4.00 mU/L       If you have any questions please call the clinic at [phone].    Sincerely,    Johana CORLEY

## 2017-06-22 NOTE — LETTER
Phyllis Aleman  [facility] [id number]  [facility] [id number]    June 27, 2017      Dear Ms. Aleman,    Your *Levetiracetam (Keppra) level is at  therapeutic (acceptable) range. Therefore no changes suggested to Keppra scheduled. Advised to rest of the results by the ordering physician if not discussed already.   Please schedule a follow-up appointment ([phone]) as suggested during recent visit.      Results for orders placed or performed in visit on 06/22/17   Lipid panel reflex to direct LDL   Result Value Ref Range    Cholesterol 165 <200 mg/dL    Triglycerides 99 <150 mg/dL    HDL Cholesterol 62 >49 mg/dL    LDL Cholesterol Calculated 83 <100 mg/dL    Non HDL Cholesterol 103 <130 mg/dL   Hemoglobin A1c   Result Value Ref Range    Hemoglobin A1C 6.7 (H) 4.3 - 6.0 %   AST   Result Value Ref Range    AST 16 0 - 45 U/L   ALT   Result Value Ref Range    ALT 27 0 - 50 U/L   CK total   Result Value Ref Range    CK Total 60 30 - 225 U/L   Basic metabolic panel   Result Value Ref Range    Sodium 136 133 - 144 mmol/L    Potassium 4.4 3.4 - 5.3 mmol/L    Chloride 98 94 - 109 mmol/L    Carbon Dioxide 29 20 - 32 mmol/L    Anion Gap 9 3 - 14 mmol/L    Glucose 143 (H) 70 - 99 mg/dL    Urea Nitrogen 18 7 - 30 mg/dL    Creatinine 0.84 0.52 - 1.04 mg/dL    GFR Estimate 66 >60 mL/min/1.7m2    GFR Estimate If Black 79 >60 mL/min/1.7m2    Calcium 9.7 8.5 - 10.1 mg/dL   TSH with free T4 reflex   Result Value Ref Range    TSH 1.09 0.40 - 4.00 mU/L   Lamotrigine level   Result Value Ref Range    Lamotrigine Level 4.5          Best wishes.    Thanks.    Sincerely,    Gen Whitaker MD, [facility]  Neurologist

## 2017-06-22 NOTE — PROGRESS NOTES
Phyllis Aleman    Enclosed are your results.  Your labs are normal/stable at this time.   You only need to take one of the potassium tabs daily.     Please call  with any questions.  We can also discuss any questions regarding these labs at your next scheduled visit.    Sincerely,    Johana Pelayo M.D.

## 2017-06-22 NOTE — MR AVS SNAPSHOT
After Visit Summary   6/22/2017    Phyllis Aleman    MRN: [id number]           Patient Information     Date Of Birth          1940        Visit Information        Provider Department      6/22/2017 7:20 AM Johana Pelayo MD [facility]        Today's Diagnoses     Routine general medical examination at a health care facility    -  1    Localization-related focal epilepsy with simple partial seizures (H)        Diabetes mellitus without complication (H)        overweight  HCC        Sarcoidosis quiet        Essential hypertension with goal blood pressure less than 140/90        Hyperlipidemia LDL goal <100          Care Instructions      Preventive Health Recommendations    Female Ages 65 +    Yearly exam:     See your health care provider every year in order to  o Review health changes.   o Discuss preventive care.    o Review your medicines if your doctor has prescribed any.      You no longer need a yearly Pap test unless you've had an abnormal Pap test in the past 10 years. If you have vaginal symptoms, such as bleeding or discharge, be sure to talk with your provider about a Pap test.      Every 1 to 2 years, have a mammogram.  If you are over 69, talk with your health care provider about whether or not you want to continue having screening mammograms.      Every 10 years, have a colonoscopy. Or, have a yearly FIT test (stool test). These exams will check for colon cancer.       Have a cholesterol test every 5 years, or more often if your doctor advises it.       Have a diabetes test (fasting glucose) every three years. If you are at risk for diabetes, you should have this test more often.       At age 65, have a bone density scan (DEXA) to check for osteoporosis (brittle bone disease).    Shots:    Get a flu shot each year.    Get a tetanus shot every 10 years.    Talk to your doctor about your pneumonia vaccines. There are now two you should receive -  Pneumovax (PPSV 23) and Prevnar (PCV 13).    Talk to your doctor about the shingles vaccine.    Talk to your doctor about the hepatitis B vaccine.    Nutrition:     Eat at least 5 servings of fruits and vegetables each day.      Eat whole-grain bread, whole-wheat pasta and brown rice instead of white grains and rice.      Talk to your provider about Calcium and Vitamin D.     Lifestyle    Exercise at least 150 minutes a week (30 minutes a day, 5 days a week). This will help you control your weight and prevent disease.      Limit alcohol to one drink per day.      No smoking.       Wear sunscreen to prevent skin cancer.       See your dentist twice a year for an exam and cleaning.      See your eye doctor every 1 to 2 years to screen for conditions such as glaucoma, macular degeneration and cataracts.      Look into [place] stool test via [facility]                Follow-ups after your visit        Your next 10 appointments already scheduled     Jun 23, 2017  8:15 AM CDT   MA SCREENING DIGITAL BILATERAL with [id number]   [facility]    [address] [place] [address] [phone]           Do not use any powder, lotion or deodorant under your arms or on your breast. If you do, we will ask you to remove it before your exam.  Wear comfortable, two-piece clothing.  If you have any allergies, tell your care team.  Bring any previous mammograms from other facilities or have them mailed to the breast center.              Who to contact     If you have questions or need follow up information about today's clinic visit or your schedule please contact [facility] directly at [phone].  Normal or non-critical lab and imaging results will be communicated to you by MyChart, letter or phone within 4 business days after the clinic has received the results. If you do not hear from us within 7 days, please contact the clinic through MyChart or phone. If  "you have a critical or abnormal lab result, we will notify you by phone as soon as possible.  Submit refill requests through [employer] or call your pharmacy and they will forward the refill request to us. Please allow 3 business days for your refill to be completed.          Additional Information About Your Visit        Intelli[employer] Systemshart Information     [employer] lets you send messages to your doctor, view your test results, renew your prescriptions, schedule appointments and more. To sign up, go to [email or web address] . Click on \"Log in\" on the left side of the screen, which will take you to the Welcome page. Then click on \"Sign up Now\" on the right side of the page.     You will be asked to enter the access code listed below, as well as some personal information. Please follow the directions to create your username and password.     Your access code is: [email or web address]  Expires: 2017 11:15 AM     Your access code will  in 90 days. If you need help or a new code, please call your [place] clinic or [phone].        Care EveryWhere ID     This is your Care EveryWhere ID. This could be used by other organizations to access your [place] medical records  [id number]        Your Vitals Were     Pulse Temperature Height Pulse Oximetry BMI (Body Mass Index)       87 97.2  F (36.2  C) (Oral) 5' 7.5\" (1.715 m) 94% 35.03 kg/m2        Blood Pressure from Last 3 Encounters:   17 131/68   17 130/70   17 130/72    Weight from Last 3 Encounters:   17 227 lb (103 kg)   17 227 lb 6.4 oz (103.1 kg)   17 230 lb (104.3 kg)              We Performed the Following     CBC with platelets differential        Primary Care Provider Office Phone # Fax #    Johana Pelayo [phone]       [facility] [address] [facility] [address]        Equal Access to Services     PETR BLANK AH: Hadii roly Warren, lucian luna, jessica whatley " jesus mezabenedicto shahaaberenice ah. Chanel [facility] [phone].    ATENCIÓN: Si bandarla noam, tiene a haque disposición servicios gratuitos de asistencia lingüística. Brii al [phone].    We comply with applicable federal civil rights laws and [place] laws. We do not discriminate on the basis of race, color, national origin, age, disability sex, sexual orientation or gender identity.            Thank you!     Thank you for choosing [facility]  for your care. Our goal is always to provide you with excellent care. Hearing back from our patients is one way we can continue to improve our services. Please take a few minutes to complete the written survey that you may receive in the mail after your visit with us. Thank you!             Your Updated Medication List - Protect others around you: Learn how to safely use, store and throw away your medicines at www.disposemymeds.org.          This list is accurate as of: 6/22/17  8:14 AM.  Always use your most recent med list.                   Brand Name Dispense Instructions for use Diagnosis    aspirin 81 MG tablet      Take 1 tablet by mouth daily.    Diabetes       blood glucose monitoring lancets     1 Box    Use to test blood sugar 1 times daily or as directed.    Type 2 diabetes, HbA1c goal < 7% (H)       blood glucose monitoring test strip    ACCU-CHEK ANA PLUS    1 Box    Use to test blood sugar 1 times daily or as directed.    Diabetes mellitus without complication (H)       CENTRUM SILVER ULTRA WOMENS PO     30    Take  by mouth daily.    Preventative health care       fish oil-omega-3 fatty acids 1000 MG capsule      Take 2 g by mouth every other day Reported on 2/16/2017        furosemide 40 MG tablet    LASIX    90 tablet    Take 1 tablet (40 mg) by mouth daily    Edema of extremities       lamoTRIgine 100 MG tablet    LaMICtal    180 tablet    Take 1 tablet (100 mg) by mouth 2 times daily        losartan 50 MG tablet     COZAAR    180 tablet    Take 1 tablet (50 mg) by mouth 2 times daily    HTN (hypertension)       metFORMIN 500 MG tablet    GLUCOPHAGE    270 tablet    Take 1 tablet (500 mg) by mouth 3 times daily (with meals) .    Type 2 diabetes, HbA1C goal < 8% (H)       potassium chloride SA 20 MEQ CR tablet    K-DUR/KLOR-CON M    90 tablet    Take 1 tablet (20 mEq) by mouth daily Patient would like the K DUR, not the Klor Con due to cost!    Edema of extremities, Essential hypertension with goal blood pressure less than 140/90       simvastatin 20 MG tablet    ZOCOR    30 tablet    Take 1 tablet (20 mg) by mouth At Bedtime 6/1/17: due for fasting labs and follow up visit    Hyperlipidemia LDL goal <100, Diabetes mellitus without complication (H)       ZANTAC 150 MG tablet   Generic drug:  ranitidine      Take  by mouth. As needed

## 2017-06-23 LAB — LAMOTRIGINE SERPL-MCNC: 4.5 UG/ML

## 2017-06-26 ENCOUNTER — TELEPHONE (OUTPATIENT)
Dept: FAMILY MEDICINE | Facility: CLINIC | Age: 77
End: 2017-06-26

## 2017-06-26 DIAGNOSIS — Z12.11 SPECIAL SCREENING FOR MALIGNANT NEOPLASMS, COLON: Primary | ICD-10-CM

## 2017-06-26 NOTE — TELEPHONE ENCOUNTER
Please advise the patient that she could come get a FIT test (or we could mail it to her). We would recommend that she continue to get mammograms for now -- she can discuss with Dr. Pelayo in the future if there is a particular upper age cutoff that she would suggest.

## 2017-06-26 NOTE — TELEPHONE ENCOUNTER
Okay, thank you .  Please sent FIT.   I will make a point top bring up future breast cancer screening with her at a future routine visit (does not need to make a special visit effort)

## 2017-06-26 NOTE — TELEPHONE ENCOUNTER
RN informed lab to send FIT.  Called and left [facility]' reply on voicemail, as advised in original message.    Jana London RN  [facility]

## 2017-06-26 NOTE — TELEPHONE ENCOUNTER
Called and spoke with patient, she would like this FIT test mailed to her.  Will leave this encounter up for [facility] to advise.      Jana London RN  [facility]

## 2017-06-26 NOTE — TELEPHONE ENCOUNTER
Reason for Call:  Other call back    Detailed comments:   1) Patient wants to know if she should still have her Mammogram done.  Is she old enough to stop doing it?  2) Last time she was in, she recalls talking about a FIT test, but did not get that test.  No order in Epic.  Can she pick it up?  Informed that PCP is out this week.  Please call to advise.    Phone Number Patient can be reached at: Home number on file [phone] (home)    Best Time: any    Can we leave a detailed message on this number? YES    Call taken on 6/26/2017 at 9:57 AM by Maxine Oconnell

## 2017-06-26 NOTE — TELEPHONE ENCOUNTER
Patient called back, she actually already has her mammogram scheduled for tomorrow AM.   Advised her that at 76 she is on the edge of not needing mammograms but still qualifies for having one.   She will go ahead and do it.    Brigida Perez RN  [facility]

## 2017-06-26 NOTE — TELEPHONE ENCOUNTER
RN sees mention of FIT test in last OV, nothing ordered in EPIC.  She would like to pick this up.      Also, should patient continue to get mammos?    Routed to PCP/covering pool.    Jana London RN  [facility]

## 2017-07-06 ENCOUNTER — TELEPHONE (OUTPATIENT)
Dept: FAMILY MEDICINE | Facility: CLINIC | Age: 77
End: 2017-07-06

## 2017-07-06 NOTE — LETTER
July 6, 2017    Phyllis Aleman  [address] [place] [address] W  [facility]  [address]  [facility] [address]    Dear Phyllis    We care about your health and have reviewed your health plan. We have reviewed your medical conditions, medication list, and lab results and are making recommendations based on this review, to better manage your health.    You are in particular need of attention regarding:  - Scheduling a Colon Cancer Screening (Colonoscopy only) [phone]      Here is a list of Health Maintenance topics that are due now or due soon:  Health Maintenance Due   Topic Date Due     COLONOSCOPY Q5 YR  02/21/2011     We will be calling you in the next couple of weeks to help you schedule any appointments that are needed.  Please call us at [phone] (or use [employer]) to address the above recommendations.     Thank you for trusting [facility] and we appreciate the opportunity to serve you.  We look forward to supporting your healthcare needs in the future.    Healthy Regards,    Dr. Pelayo/sera

## 2017-07-06 NOTE — TELEPHONE ENCOUNTER
Panel Management Review      Patient has the following on her problem list:       Composite cancer screening  Chart review shows that this patient is due/due soon for the following Colonoscopy  Summary:    Patient is due/failing the following:   COLONOSCOPY    Action needed:   Due for a colonoscopy     Type of outreach:    Sent letter.    Questions for provider review:    None                                                                                                                                    Jeanette Holly ma       Chart routed to Care Team .

## 2017-07-07 ENCOUNTER — RADIANT APPOINTMENT (OUTPATIENT)
Dept: MAMMOGRAPHY | Facility: CLINIC | Age: 77
End: 2017-07-07
Payer: MEDICARE

## 2017-07-07 DIAGNOSIS — Z12.31 VISIT FOR SCREENING MAMMOGRAM: ICD-10-CM

## 2017-07-07 PROCEDURE — G0202 SCR MAMMO BI INCL CAD: HCPCS | Mod: TC

## 2017-09-17 ENCOUNTER — HEALTH MAINTENANCE LETTER (OUTPATIENT)
Age: 77
End: 2017-09-17

## 2017-10-12 ENCOUNTER — TRANSFERRED RECORDS (OUTPATIENT)
Dept: HEALTH INFORMATION MANAGEMENT | Facility: CLINIC | Age: 77
End: 2017-10-12

## 2017-10-12 ENCOUNTER — TELEPHONE (OUTPATIENT)
Dept: FAMILY MEDICINE | Facility: CLINIC | Age: 77
End: 2017-10-12

## 2017-10-12 DIAGNOSIS — Z12.11 SPECIAL SCREENING FOR MALIGNANT NEOPLASMS, COLON: Primary | ICD-10-CM

## 2017-10-12 NOTE — LETTER
October 12, 2017    Phyllis Aleman  [address] [place] [address] W  [facility]  [address]  [facility] [address]    Dear Phyllis    We care about your health and have reviewed your health plan. We have reviewed your medical conditions, medication list, and lab results and are making recommendations based on this review, to better manage your health.    You are in particular need of attention regarding:  - Scheduling a Colon Cancer Screening (Colonoscopy only) [phone]      Here is a list of Health Maintenance topics that are due now or due soon:  Health Maintenance Due   Topic Date Due     COLONOSCOPY Q5 YR  02/21/2011     INFLUENZA VACCINE (SYSTEM ASSIGNED)  09/01/2017     MICROALBUMIN Q1 YEAR  11/02/2017     We will be calling you in the next couple of weeks to help you schedule any appointments that are needed.  Please call us at [phone] (or use [employer]) to address the above recommendations.     Thank you for trusting [facility] and we appreciate the opportunity to serve you.  We look forward to supporting your healthcare needs in the future.    Healthy Regards,    Dr. Pelayo/sera henley

## 2017-10-20 NOTE — TELEPHONE ENCOUNTER
Spoke with patient and she is willing to complete a FIT test, please sign the order and route chart back to me.

## 2017-12-06 ENCOUNTER — TELEPHONE (OUTPATIENT)
Dept: FAMILY MEDICINE | Facility: CLINIC | Age: 77
End: 2017-12-06

## 2017-12-06 NOTE — TELEPHONE ENCOUNTER
Forms received from: [employer]   Phone number listed: [phone]   Fax listed: [phone]  Date received: 12/06/2017  Form description: Diabetic Detailed Written Order  Once forms are completed, please return to [employer] via fax.  Is patient requesting to be contacted when forms are completed: NA    Form placed: In provider's basket  Jeannine Kong

## 2017-12-13 ENCOUNTER — ALLIED HEALTH/NURSE VISIT (OUTPATIENT)
Dept: NURSING | Facility: CLINIC | Age: 77
End: 2017-12-13
Payer: MEDICARE

## 2017-12-13 DIAGNOSIS — Z23 NEED FOR PROPHYLACTIC VACCINATION AND INOCULATION AGAINST INFLUENZA: Primary | ICD-10-CM

## 2017-12-13 PROCEDURE — G0008 ADMIN INFLUENZA VIRUS VAC: HCPCS

## 2017-12-13 PROCEDURE — 90662 IIV NO PRSV INCREASED AG IM: CPT

## 2017-12-13 PROCEDURE — 99207 ZZC NO CHARGE NURSE ONLY: CPT

## 2017-12-13 NOTE — MR AVS SNAPSHOT
"              After Visit Summary   12/13/2017    Phyllis Aleman    MRN: [id number]           Patient Information     Date Of Birth          1940        Visit Information        Provider Department      12/13/2017 3:30 PM CP ANCILLARY [facility]        Today's Diagnoses     Need for prophylactic vaccination and inoculation against influenza    -  1       Follow-ups after your visit        Your next 10 appointments already scheduled     Dec 13, 2017  3:30 PM CST   Nurse Only with CP ANCILLARY   [facility]    [address] [facility] [address] [phone]              Who to contact     If you have questions or need follow up information about today's clinic visit or your schedule please contact [facility] directly at [phone].  Normal or non-critical lab and imaging results will be communicated to you by MyChart, letter or phone within 4 business days after the clinic has received the results. If you do not hear from us within 7 days, please contact the clinic through MyChart or phone. If you have a critical or abnormal lab result, we will notify you by phone as soon as possible.  Submit refill requests through [employer] or call your pharmacy and they will forward the refill request to us. Please allow 3 business days for your refill to be completed.          Additional Information About Your Visit        [employer] Information     [employer] lets you send messages to your doctor, view your test results, renew your prescriptions, schedule appointments and more. To sign up, go to [email or web address] . Click on \"Log in\" on the left side of the screen, which will take you to the Welcome page. Then click on \"Sign up Now\" on the right side of the page.     You will be asked to enter the access code listed below, as well as some personal information. Please follow the directions " to create your username and password.     Your access code is: [email or web address]  Expires: 3/13/2018  3:06 PM     Your access code will  in 90 days. If you need help or a new code, please call your [place] clinic or [phone].        Care EveryWhere ID     This is your Care EveryWhere ID. This could be used by other organizations to access your [place] medical records  [id number]         Blood Pressure from Last 3 Encounters:   17 131/68   17 130/70   17 130/72    Weight from Last 3 Encounters:   17 227 lb (103 kg)   17 227 lb 6.4 oz (103.1 kg)   17 230 lb (104.3 kg)              We Performed the Following     FLU VACCINE, INCREASED ANTIGEN, PRESV FREE, AGE 65+ [31882]     Vaccine Administration, Initial [02657]        Primary Care Provider Office Phone # Fax #    Johana Pelayo [phone]       [address] [facility] [address]        Equal Access to Services     KAUR BLANK : Hadii aad ku hadasho Soomaali, waaxda luqadaha, qaybta kaalmada adeegyada, jesus macario . So [facility] [phone].    ATENCIÓN: Si habla español, tiene a haque disposición servicios gratuitos de asistencia lingüística. Llame al [phone].    We comply with applicable federal civil rights laws and [place] laws. We do not discriminate on the basis of race, color, national origin, age, disability, sex, sexual orientation, or gender identity.            Thank you!     Thank you for choosing [facility]  for your care. Our goal is always to provide you with excellent care. Hearing back from our patients is one way we can continue to improve our services. Please take a few minutes to complete the written survey that you may receive in the mail after your visit with us. Thank you!             Your Updated Medication List - Protect others around you: Learn how to safely use, store and throw away your medicines at  www.disposemymeds.org.          This list is accurate as of: 12/13/17  3:06 PM.  Always use your most recent med list.                   Brand Name Dispense Instructions for use Diagnosis    aspirin 81 MG tablet      Take 1 tablet by mouth daily.    Diabetes       blood glucose monitoring lancets     100 each    Use to test blood sugar 1 times daily or as directed.    Type 2 diabetes mellitus without complication, without long-term current use of insulin (H)       blood glucose monitoring test strip    ACCU-CHEK ANA PLUS    1 Box    Use to test blood sugar 1 times daily or as directed.    Type 2 diabetes mellitus without complication, without long-term current use of insulin (H)       CENTRUM SILVER ULTRA WOMENS PO     30    Take  by mouth daily.    [facility] health care       fish oil-omega-3 fatty acids 1000 MG capsule      Take 2 g by mouth every other day Reported on 2/16/2017        furosemide 40 MG tablet    LASIX    90 tablet    Take 1 tablet (40 mg) by mouth daily    Edema of extremities       lamoTRIgine 100 MG tablet    LaMICtal    180 tablet    Take 1 tablet (100 mg) by mouth 2 times daily        losartan 50 MG tablet    COZAAR    180 tablet    Take 1 tablet (50 mg) by mouth 2 times daily    Type 2 diabetes mellitus without complication, without long-term current use of insulin (H), Essential hypertension with goal blood pressure less than 140/90       metFORMIN 500 MG tablet    GLUCOPHAGE    270 tablet    Take 1 tablet (500 mg) by mouth 3 times daily (with meals) .    Type 2 diabetes mellitus without complication, without long-term current use of insulin (H)       potassium chloride SA 20 MEQ CR tablet    K-DUR/KLOR-CON M    90 tablet    Take 1 tablet (20 mEq) by mouth daily Patient would like the K DUR, not the Klor Con due to cost!    Edema of extremities, Essential hypertension with goal blood pressure less than 140/90       simvastatin 20 MG tablet    ZOCOR    90 tablet    Take 1 tablet (20 mg)  by mouth At Bedtime 6/1/17: due for fasting labs and follow up visit    Hyperlipidemia LDL goal <100       ZANTAC 150 MG tablet   Generic drug:  ranitidine      Take  by mouth. As needed

## 2017-12-13 NOTE — NURSING NOTE
Prior to injection verified patient identity using patient's name and date of birth.  RAYA Carroll MA    VIS for Influenza given on same date of administration.  Staff signature/Title: RAYA Carroll MA    Per orders of Dr. Pelayo, injection of Influenza given by Qing Carroll CMA. Patient instructed to remain in clinic for 15 minutes afterwards, and to report any adverse reaction to me immediately.

## 2017-12-13 NOTE — PROGRESS NOTES

## 2018-01-30 LAB — HBA1C MFR BLD: 6.2 % (ref 4.3–6)

## 2018-01-30 PROCEDURE — 83036 HEMOGLOBIN GLYCOSYLATED A1C: CPT | Performed by: INTERNAL MEDICINE

## 2018-01-30 PROCEDURE — 36415 COLL VENOUS BLD VENIPUNCTURE: CPT | Performed by: INTERNAL MEDICINE

## 2018-01-30 NOTE — PROGRESS NOTES
Phyllis Aleman    Your A1C is really great.  Well controlled diabetes.     Best,     ASHLY FOURNIER M.D.

## 2018-01-30 NOTE — LETTER
[facility]  [address] [place]  [address]  [phone]        January 31, 2018    Phyllis Aleman  [address]  [address]  [facility] [address]        Dear Phyllis,    Your A1C is really great.  Well controlled diabetes.     Results for orders placed or performed in visit on 01/30/18   Hemoglobin A1c   Result Value Ref Range    Hemoglobin A1C 6.2 (H) 4.3 - 6.0 %       If you have any questions please call the clinic at [phone].    Sincerely,    Johana CORLEY

## 2018-02-05 ENCOUNTER — OFFICE VISIT (OUTPATIENT)
Dept: OPHTHALMOLOGY | Facility: CLINIC | Age: 78
End: 2018-02-05
Payer: MEDICARE

## 2018-02-05 DIAGNOSIS — H43.813 PVD (POSTERIOR VITREOUS DETACHMENT), BILATERAL: ICD-10-CM

## 2018-02-05 DIAGNOSIS — D31.32 CHOROIDAL NEVUS OF LEFT EYE: ICD-10-CM

## 2018-02-05 DIAGNOSIS — H52.4 PRESBYOPIA: ICD-10-CM

## 2018-02-05 DIAGNOSIS — E11.9 TYPE 2 DIABETES MELLITUS WITHOUT COMPLICATION, WITHOUT LONG-TERM CURRENT USE OF INSULIN (H): Primary | ICD-10-CM

## 2018-02-05 DIAGNOSIS — Z96.1 PSEUDOPHAKIA: ICD-10-CM

## 2018-02-05 PROCEDURE — 92014 COMPRE OPH EXAM EST PT 1/>: CPT | Performed by: STUDENT IN AN ORGANIZED HEALTH CARE EDUCATION/TRAINING PROGRAM

## 2018-02-05 PROCEDURE — 92015 DETERMINE REFRACTIVE STATE: CPT | Mod: GY | Performed by: STUDENT IN AN ORGANIZED HEALTH CARE EDUCATION/TRAINING PROGRAM

## 2018-02-05 ASSESSMENT — REFRACTION_MANIFEST
OS_AXIS: 163
OD_ADD: +3.00
OS_ADD: +3.00
OD_AXIS: 025
OD_SPHERE: -0.25
OS_SPHERE: -1.00
OD_CYLINDER: +0.50
OS_CYLINDER: +1.00

## 2018-02-05 ASSESSMENT — REFRACTION_WEARINGRX
OS_CYLINDER: +0.75
OD_CYLINDER: +0.50
OD_ADD: +3.00
OD_SPHERE: -0.25
OD_AXIS: 002
OS_SPHERE: -0.50
OS_ADD: +3.00
SPECS_TYPE: PAL
OS_AXIS: 003

## 2018-02-05 ASSESSMENT — EXTERNAL EXAM - LEFT EYE: OS_EXAM: NORMAL

## 2018-02-05 ASSESSMENT — VISUAL ACUITY
OS_CC: 20/25-2
OS_CC: J1
OD_CC: 20/20
CORRECTION_TYPE: GLASSES
METHOD: SNELLEN - LINEAR
OD_CC: J1

## 2018-02-05 ASSESSMENT — SLIT LAMP EXAM - LIDS
COMMENTS: 1+ DERMATOCHALASIS
COMMENTS: 1+ DERMATOCHALASIS

## 2018-02-05 ASSESSMENT — CUP TO DISC RATIO
OD_RATIO: 0.3
OS_RATIO: 0.2

## 2018-02-05 ASSESSMENT — TONOMETRY
IOP_METHOD: TONOPEN
OD_IOP_MMHG: 15
OS_IOP_MMHG: 14

## 2018-02-05 ASSESSMENT — CONF VISUAL FIELD
OS_NORMAL: 1
OD_NORMAL: 1

## 2018-02-05 ASSESSMENT — EXTERNAL EXAM - RIGHT EYE: OD_EXAM: NORMAL

## 2018-02-05 NOTE — PATIENT INSTRUCTIONS
Glasses prescription given - optional     Shirley Edwards MD  [phone]    Diabetes weakens the blood vessels all over the body, including the eyes. Damage to the blood vessels in the eyes can cause swelling or bleeding into part of the eye (called the retina). This is called diabetic retinopathy (CHARLA-tin--puh-thee). If not treated, this disease can cause vision loss or blindness.   Symptoms may include blurred or distorted vision, but many people have no symptoms. It's important to see your eye doctor regularly to check for problems.   Early treatment and good control can help protect your vision. Here are the things you can do to help prevent vision loss:      1. Keep your blood sugar levels under tight control.      2. Bring high blood pressure under control.      3. No smoking.      4. Have yearly dilated eye exams.

## 2018-02-05 NOTE — MR AVS SNAPSHOT
After Visit Summary   2/5/2018    Phyllis Aleman    MRN: [id number]           Patient Information     Date Of Birth          1940        Visit Information        Provider Department      2/5/2018 2:00 PM Shirley Edwards MD [facility]        Today's Diagnoses     Presbyopia    -  1    Type 2 diabetes mellitus without complication, without long-term current use of insulin (H)        Pseudophakia OU        PVD (posterior vitreous detachment), bilateral        Choroidal nevus of left eye          Care Instructions    Glasses prescription given - optional     Shirley Edwards MD  [phone]    Diabetes weakens the blood vessels all over the body, including the eyes. Damage to the blood vessels in the eyes can cause swelling or bleeding into part of the eye (called the retina). This is called diabetic retinopathy (CHARLA-tin-AH-pu-thee). If not treated, this disease can cause vision loss or blindness.   Symptoms may include blurred or distorted vision, but many people have no symptoms. It's important to see your eye doctor regularly to check for problems.   Early treatment and good control can help protect your vision. Here are the things you can do to help prevent vision loss:      1. Keep your blood sugar levels under tight control.      2. Bring high blood pressure under control.      3. No smoking.      4. Have yearly dilated eye exams.            Follow-ups after your visit        Follow-up notes from your care team     Return in about 1 year (around 2/5/2019) for Complete Exam.      Your next 10 appointments already scheduled     Feb 06, 2018 11:00 AM CST   [facility]    [address] [place] [id number] [phone]           Bring a current list of meds and any records pertaining to this visit. For Physicals, please bring immunization  "records and any forms needing to be filled out. Please arrive 10 minutes early to complete paperwork.              Who to contact     If you have questions or need follow up information about today's clinic visit or your schedule please contact [facility] directly at [phone].  Normal or non-critical lab and imaging results will be communicated to you by MyChart, letter or phone within 4 business days after the clinic has received the results. If you do not hear from us within 7 days, please contact the clinic through [employer]hart or phone. If you have a critical or abnormal lab result, we will notify you by phone as soon as possible.  Submit refill requests through [employer] or call your pharmacy and they will forward the refill request to us. Please allow 3 business days for your refill to be completed.          Additional Information About Your Visit        [facility] Information     [employer] lets you send messages to your doctor, view your test results, renew your prescriptions, schedule appointments and more. To sign up, go to [email or web address] . Click on \"Log in\" on the left side of the screen, which will take you to the Welcome page. Then click on \"Sign up Now\" on the right side of the page.     You will be asked to enter the access code listed below, as well as some personal information. Please follow the directions to create your username and password.     Your access code is: [email or web address]  Expires: 3/13/2018  3:06 PM     Your access code will  in 90 days. If you need help or a new code, please call your [facility] clinic or [phone].        Care EveryWhere ID     This is your Care EveryWhere ID. This could be used by other organizations to access your [facility] medical records  [id number]         Blood Pressure from Last 3 Encounters:   17 131/68   17 130/70   17 130/72    Weight from Last 3 Encounters:   17 103 kg (227 lb)   17 103.1 kg (227 lb 6.4 oz) "   02/16/17 104.3 kg (230 lb)              Today, you had the following     No orders found for display       Primary Care Provider Office Phone # Fax #    Johana Pelayo [phone]       [address] [facility] [address]        Equal Access to Services     PETR BLANK : Hadii aad ku hadasho Soomaali, waaxda luqadaha, qaybta kaalmada adeegyada, waxay idiin hayseverianon adebenedicto khvaughnyolanda larosalino clifton. So [facility] [phone].    ATENCIÓN: Si habla español, tiene a haque disposición servicios gratuitos de asistencia lingüística. Llame al [phone].    We comply with applicable federal civil rights laws and [place] laws. We do not discriminate on the basis of race, color, national origin, age, disability, sex, sexual orientation, or gender identity.            Thank you!     Thank you for choosing [facility]  for your care. Our goal is always to provide you with excellent care. Hearing back from our patients is one way we can continue to improve our services. Please take a few minutes to complete the written survey that you may receive in the mail after your visit with us. Thank you!             Your Updated Medication List - Protect others around you: Learn how to safely use, store and throw away your medicines at www.disposemymeds.org.          This list is accurate as of 2/5/18  2:25 PM.  Always use your most recent med list.                   Brand Name Dispense Instructions for use Diagnosis    aspirin 81 MG tablet      Take 1 tablet by mouth daily.    Diabetes       blood glucose monitoring lancets     100 each    Use to test blood sugar 1 times daily or as directed.    Type 2 diabetes mellitus without complication, without long-term current use of insulin (H)       blood glucose monitoring test strip    ACCU-CHEK ANA PLUS    1 Box    Use to test blood sugar 1 times daily or as directed.    Type 2 diabetes mellitus without complication, without long-term current use of insulin  (H)       CENTRUM SILVER ULTRA WOMENS PO     30    Take  by mouth daily.    Preventative health care       fish oil-omega-3 fatty acids 1000 MG capsule      Take 2 g by mouth every other day Reported on 2/16/2017        furosemide 40 MG tablet    LASIX    90 tablet    Take 1 tablet (40 mg) by mouth daily    Edema of extremities       lamoTRIgine 100 MG tablet    LaMICtal    180 tablet    Take 1 tablet (100 mg) by mouth 2 times daily        losartan 50 MG tablet    COZAAR    180 tablet    Take 1 tablet (50 mg) by mouth 2 times daily    Type 2 diabetes mellitus without complication, without long-term current use of insulin (H), Essential hypertension with goal blood pressure less than 140/90       metFORMIN 500 MG tablet    GLUCOPHAGE    270 tablet    Take 1 tablet (500 mg) by mouth 3 times daily (with meals) .    Type 2 diabetes mellitus without complication, without long-term current use of insulin (H)       potassium chloride SA 20 MEQ CR tablet    K-DUR/KLOR-CON M    90 tablet    Take 1 tablet (20 mEq) by mouth daily Patient would like the K DUR, not the Klor Con due to cost!    Edema of extremities, Essential hypertension with goal blood pressure less than 140/90       simvastatin 20 MG tablet    ZOCOR    90 tablet    Take 1 tablet (20 mg) by mouth At Bedtime 6/1/17: due for fasting labs and follow up visit    Hyperlipidemia LDL goal <100       ZANTAC 150 MG tablet   Generic drug:  ranitidine      Take  by mouth. As needed

## 2018-02-05 NOTE — PROGRESS NOTES
Current Eye Medications:  no     Subjective:  Diabetic eye exam   Patient reports is seeing well, vision seems unchanged and states eyes seem otherwise fine.   Lab Results   Component Value Date    A1C 6.2 01/30/2018    A1C 6.7 06/22/2017    A1C 6.6 11/02/2016    A1C 6.6 06/28/2016    A1C 6.7 04/12/2016       Objective:  See Ophthalmology Exam.      Assessment:  Phyllis Aleman is a 77 year old female who presents with:     Type 2 diabetes mellitus without complication, without long-term current use of insulin (H) Negative diabetic retinopathy       Pseudophakia OU      PVD (posterior vitreous detachment), bilateral      Choroidal nevus of left eye        Plan:  Glasses prescription given - optional     Shirley Edwards MD  [phone]

## 2018-02-05 NOTE — LETTER
2/5/2018     RE: Phyllis Aleman  [address]  [facility] [address]    Dear Colleague,    Thank you for referring your patient, Phyllis Aleman, to the [facility]. She has no evidence of diabetic retinopathy in either eye. Please see a copy of my visit note below.     Current Eye Medications:  no     Subjective:  Diabetic eye exam   Patient reports is seeing well, vision seems unchanged and states eyes seem otherwise fine.   Lab Results   Component Value Date    A1C 6.2 01/30/2018    A1C 6.7 06/22/2017    A1C 6.6 11/02/2016    A1C 6.6 06/28/2016    A1C 6.7 04/12/2016       Objective:  See Ophthalmology Exam.      Assessment:  Phyllis Aleman is a 77 year old female who presents with:     Type 2 diabetes mellitus without complication, without long-term current use of insulin (H) Negative diabetic retinopathy       Pseudophakia OU      PVD (posterior vitreous detachment), bilateral      Choroidal nevus of left eye        Plan:  Glasses prescription given - optional     Shirley Edwards MD  [phone]               Again, thank you for allowing me to participate in the care of your patient.        Sincerely,        Shirley Edwards MD

## 2018-02-06 ENCOUNTER — OFFICE VISIT (OUTPATIENT)
Dept: FAMILY MEDICINE | Facility: CLINIC | Age: 78
End: 2018-02-06
Payer: MEDICARE

## 2018-02-06 VITALS
DIASTOLIC BLOOD PRESSURE: 70 MMHG | WEIGHT: 216 LBS | OXYGEN SATURATION: 95 % | BODY MASS INDEX: 33.33 KG/M2 | HEART RATE: 57 BPM | SYSTOLIC BLOOD PRESSURE: 114 MMHG | TEMPERATURE: 96.8 F

## 2018-02-06 DIAGNOSIS — R60.0 EDEMA OF EXTREMITIES: ICD-10-CM

## 2018-02-06 DIAGNOSIS — E11.9 TYPE 2 DIABETES MELLITUS WITHOUT COMPLICATION, WITHOUT LONG-TERM CURRENT USE OF INSULIN (H): Primary | ICD-10-CM

## 2018-02-06 DIAGNOSIS — E78.5 HYPERLIPIDEMIA LDL GOAL <100: ICD-10-CM

## 2018-02-06 DIAGNOSIS — G40.109 LOCALIZATION-RELATED FOCAL EPILEPSY WITH SIMPLE PARTIAL SEIZURES (H): ICD-10-CM

## 2018-02-06 DIAGNOSIS — I10 ESSENTIAL HYPERTENSION WITH GOAL BLOOD PRESSURE LESS THAN 140/90: Chronic | ICD-10-CM

## 2018-02-06 DIAGNOSIS — D86.9 SARCOIDOSIS: ICD-10-CM

## 2018-02-06 PROCEDURE — 99214 OFFICE O/P EST MOD 30 MIN: CPT | Performed by: INTERNAL MEDICINE

## 2018-02-06 ASSESSMENT — PAIN SCALES - GENERAL: PAINLEVEL: NO PAIN (0)

## 2018-02-06 NOTE — PATIENT INSTRUCTIONS
"Return to clinic late June or afterwards.  Refills okay     Diabetic shoes:  Wider toe box.    Try \"diabetic socks\".       "

## 2018-02-06 NOTE — MR AVS SNAPSHOT
"              After Visit Summary   2/6/2018    Phyllis Aleman    MRN: [id number]           Patient Information     Date Of Birth          1940        Visit Information        Provider Department      2/6/2018 11:00 AM Johana Pelayo MD [facility]        Today's Diagnoses     Type 2 diabetes mellitus without complication, without long-term current use of insulin (H)    -  1    Essential hypertension with goal blood pressure less than 140/90        Sarcoidosis quiet        Hyperlipidemia LDL goal <100        Localization-related focal epilepsy with simple partial seizures (H)        Edema of extremities          Care Instructions    Return to clinic late June or afterwards.  Refills okay     Diabetic shoes:  Wider toe box.    Try \"diabetic socks\".               Follow-ups after your visit        Follow-up notes from your care team     Return in about 6 months (around 8/6/2018) for Physical Exam, diabetes follow up.      Future tests that were ordered for you today     Open Future Orders        Priority Expected Expires Ordered    Albumin Random Urine Quantitative with Creat Ratio Routine  2/6/2019 2/6/2018    Vitamin B12 Routine  2/5/2019 2/6/2018    Basic metabolic panel Routine  2/4/2019 2/6/2018    Lipid panel reflex to direct LDL Fasting Routine  2/4/2019 2/6/2018    Hemoglobin A1c Routine  2/5/2019 2/6/2018    AST Routine  2/5/2019 2/6/2018    ALT Routine  2/5/2019 2/6/2018            Who to contact     If you have questions or need follow up information about today's clinic visit or your schedule please contact [facility] directly at [phone].  Normal or non-critical lab and imaging results will be communicated to you by MyChart, letter or phone within 4 business days after the clinic has received the results. If you do not hear from us within 7 days, please contact the clinic through MyChart or phone. If you have a critical or abnormal lab " "result, we will notify you by phone as soon as possible.  Submit refill requests through [employer] or call your pharmacy and they will forward the refill request to us. Please allow 3 business days for your refill to be completed.          Additional Information About Your Visit        [employer] Information     [employer] lets you send messages to your doctor, view your test results, renew your prescriptions, schedule appointments and more. To sign up, go to [email or web address] . Click on \"Log in\" on the left side of the screen, which will take you to the Welcome page. Then click on \"Sign up Now\" on the right side of the page.     You will be asked to enter the access code listed below, as well as some personal information. Please follow the directions to create your username and password.     Your access code is: [email or web address]  Expires: 3/13/2018  3:06 PM     Your access code will  in 90 days. If you need help or a new code, please call your [place] clinic or [phone].        Care EveryWhere ID     This is your Care EveryWhere ID. This could be used by other organizations to access your [facility] medical records  [id number]        Your Vitals Were     Pulse Temperature Pulse Oximetry BMI (Body Mass Index)          57 96.8  F (36  C) (Oral) 95% 33.33 kg/m2         Blood Pressure from Last 3 Encounters:   18 114/70   17 131/68   17 130/70    Weight from Last 3 Encounters:   18 216 lb (98 kg)   17 227 lb (103 kg)   17 227 lb 6.4 oz (103.1 kg)                 Today's Medication Changes          These changes are accurate as of 18 11:40 AM.  If you have any questions, ask your nurse or doctor.               Start taking these medicines.        Dose/Directions    order for DME   Used for:  Type 2 diabetes mellitus without complication, without long-term current use of insulin (H)   Started by:  Johana Pelayo MD        Equipment being ordered: 1 pair diabetic shoes. "   Quantity:  1 each   Refills:  0            Where to get your medicines      Some of these will need a paper prescription and others can be bought over the counter.  Ask your nurse if you have questions.     Bring a paper prescription for each of these medications     order for DME                Primary Care Provider Office Phone # Fax #    Johana Pelayo [phone]       [address] [facility] [address]        Equal Access to Services     PETR BLANK : Hadii aad ku hadasho Soomaali, waaxda luqadaha, qaybta kaalmada adeegyada, waxay idiin hayseverianon adeeg khvuaghnyolanda larosalino . So [facility] [phone].    ATENCIÓN: Si habla espdeidra, tiene a haque disposición servicios gratuitos de asistencia lingüística. Llame al [phone].    We comply with applicable federal civil rights laws and [place] laws. We do not discriminate on the basis of race, color, national origin, age, disability, sex, sexual orientation, or gender identity.            Thank you!     Thank you for choosing [facility]  for your care. Our goal is always to provide you with excellent care. Hearing back from our patients is one way we can continue to improve our services. Please take a few minutes to complete the written survey that you may receive in the mail after your visit with us. Thank you!             Your Updated Medication List - Protect others around you: Learn how to safely use, store and throw away your medicines at www.disposemymeds.org.          This list is accurate as of 2/6/18 11:40 AM.  Always use your most recent med list.                   Brand Name Dispense Instructions for use Diagnosis    aspirin 81 MG tablet      Take 1 tablet by mouth daily.    Diabetes       blood glucose monitoring lancets     100 each    Use to test blood sugar 1 times daily or as directed.    Type 2 diabetes mellitus without complication, without long-term current use of insulin (H)       blood glucose  monitoring test strip    ACCU-CHEK ANA PLUS    1 Box    Use to test blood sugar 1 times daily or as directed.    Type 2 diabetes mellitus without complication, without long-term current use of insulin (H)       CENTRUM SILVER ULTRA WOMENS PO     30    Take  by mouth daily.    Preventative health care       furosemide 40 MG tablet    LASIX    90 tablet    Take 1 tablet (40 mg) by mouth daily    Edema of extremities       lamoTRIgine 100 MG tablet    LaMICtal    180 tablet    Take 1 tablet (100 mg) by mouth 2 times daily        losartan 50 MG tablet    COZAAR    180 tablet    Take 1 tablet (50 mg) by mouth 2 times daily    Type 2 diabetes mellitus without complication, without long-term current use of insulin (H), Essential hypertension with goal blood pressure less than 140/90       metFORMIN 500 MG tablet    GLUCOPHAGE    270 tablet    Take 1 tablet (500 mg) by mouth 3 times daily (with meals) .    Type 2 diabetes mellitus without complication, without long-term current use of insulin (H)       order for DME     1 each    Equipment being ordered: 1 pair diabetic shoes.    Type 2 diabetes mellitus without complication, without long-term current use of insulin (H)       potassium chloride SA 20 MEQ CR tablet    K-DUR/KLOR-CON M    90 tablet    Take 1 tablet (20 mEq) by mouth daily Patient would like the K DUR, not the Klor Con due to cost!    Edema of extremities, Essential hypertension with goal blood pressure less than 140/90       simvastatin 20 MG tablet    ZOCOR    90 tablet    Take 1 tablet (20 mg) by mouth At Bedtime 6/1/17: due for fasting labs and follow up visit    Hyperlipidemia LDL goal <100       ZANTAC 150 MG tablet   Generic drug:  ranitidine      Take  by mouth. As needed

## 2018-02-06 NOTE — PROGRESS NOTES
SUBJECTIVE:   Phyllis Aleman is a 77 year old female who presents to clinic today for the following health issues:    75 y/o WF with uncomplicated DM II, sz disorder (Lamictal), quiet sarcoidosis here for follow up diabetes(metformin).  recent A1C is 6.2.  Denies metformin side effects  Discussed ongoing mammograms after age 76 at next visit (normal mammogram 7/2017).     Diabetes Follow-up    Patient is checking blood sugars: once daily.  Results are as follows:         am - 139-145         [id number]    Diabetic concerns: None     Symptoms of hypoglycemia (low blood sugar): dizzy, weak     Paresthesias (numbness or burning in feet) or sores: Yes once in a while, third toe in on right foot     Date of last diabetic eye exam: 2/5/18    Hyperlipidemia Follow-Up      Rate your low fat/cholesterol diet?: fair    Taking statin?  Yes, no muscle aches from statin    Other lipid medications/supplements?:  none    Hypertension Follow-up      Outpatient blood pressures are not being checked.    Low Salt Diet: low salt    BP Readings from Last 2 Encounters:   02/06/18 114/70   06/22/17 131/68     Hemoglobin A1C (%)   Date Value   01/30/2018 6.2 (H)   06/22/2017 6.7 (H)     LDL Cholesterol Calculated (mg/dL)   Date Value   06/22/2017 83   04/12/2016 79       Amount of exercise or physical activity: Some walking but not a lot     Problems taking medications regularly: No    Medication side effects: none    Diet: low salt and diabetic             Problem list and histories reviewed & adjusted, as indicated.  Additional history: as documented    Patient Active Problem List   Diagnosis     Colon polyp     Pseudophakia OU     Sarcoidosis quiet     Advanced directives, counseling/discussion     Hyperlipidemia LDL goal <100     Endolymphatic hydrops     Edema of extremities     Localization-related focal epilepsy with simple partial seizures (H)     PVD (posterior vitreous detachment), bilateral     overweight  HCC      Essential hypertension with goal blood pressure less than 140/90     Choroidal nevus of left eye     Diabetic polyneuropathy associated with type 2 diabetes mellitus (H)     Nail dystrophy     Type 2 diabetes mellitus without complication, without long-term current use of insulin (H)     Past Surgical History:   Procedure Laterality Date     BIOPSY  1997    For sarcoidosis.      CATARACT IOL, RT/LT  2003, 2006    right, left - Dr. Cuevas     COLONOSCOPY       HC REMOVAL GALLBLADDER  2003     TONSILLECTOMY         Social History   Substance Use Topics     Smoking status: Never Smoker     Smokeless tobacco: Never Used     Alcohol use 0.0 oz/week     0 Standard drinks or equivalent per week      Comment: 1 drink a month or less, wine or deidra     Family History   Problem Relation Age of Onset     DIABETES Mother      HEART DISEASE Father      Hypertension Son          Current Outpatient Prescriptions   Medication Sig Dispense Refill     simvastatin (ZOCOR) 20 MG tablet Take 1 tablet (20 mg) by mouth At Bedtime 6/1/17: due for fasting labs and follow up visit 90 tablet 3     potassium chloride SA (K-DUR/KLOR-CON M) 20 MEQ CR tablet Take 1 tablet (20 mEq) by mouth daily Patient would like the K DUR, not the Klor Con due to cost! 90 tablet 3     furosemide (LASIX) 40 MG tablet Take 1 tablet (40 mg) by mouth daily 90 tablet 3     losartan (COZAAR) 50 MG tablet Take 1 tablet (50 mg) by mouth 2 times daily 180 tablet 3     metFORMIN (GLUCOPHAGE) 500 MG tablet Take 1 tablet (500 mg) by mouth 3 times daily (with meals) . 270 tablet 3     blood glucose monitoring (ACCU-CHEK ANA PLUS) test strip Use to test blood sugar 1 times daily or as directed. 1 Box 3     blood glucose monitoring (ACCU-CHEK FASTCLIX) lancets Use to test blood sugar 1 times daily or as directed. 100 each 3     lamoTRIgine (LAMICTAL) 100 MG tablet Take 1 tablet (100 mg) by mouth 2 times daily 180 tablet 3     ranitidine (ZANTAC) 150 MG tablet Take  by  mouth. As needed       aspirin 81 MG tablet Take 1 tablet by mouth daily.  3     Multiple Vitamins-Minerals (CENTRUM SILVER ULTRA WOMENS PO) Take  by mouth daily. 30 0     Allergies   Allergen Reactions     Latex      Penicillins      Recent Labs   Lab Test  01/30/18   0910  06/22/17   0658  11/02/16   0731   04/12/16   0759   04/24/15   0802  10/06/14   0746   A1C  6.2*  6.7*  6.6*   < >  6.7*   < >  7.1*  7.1*   LDL   --   83   --    --   79   --   73  75   HDL   --   62   --    --   64   --   58  65   TRIG   --   99   --    --   91   --   125  105   ALT   --   27   --    --    --    --   29  31   CR   --   0.84  0.83   < >   --    < >  0.90  0.85   GFRESTIMATED   --   66  67   < >   --    < >  61  66   GFRESTBLACK   --   79  81   < >   --    < >  74  80   POTASSIUM   --   4.4  4.5   < >   --    < >  4.3  4.1   TSH   --   1.09  1.36   --    --    --   0.96   --     < > = values in this interval not displayed.      BP Readings from Last 3 Encounters:   02/06/18 114/70   06/22/17 131/68   04/19/17 130/70    Wt Readings from Last 3 Encounters:   02/06/18 216 lb (98 kg)   06/22/17 227 lb (103 kg)   04/19/17 227 lb 6.4 oz (103.1 kg)                  Labs reviewed in EPIC    Reviewed and updated as needed this visit by clinical staff  Tobacco  Allergies  Meds       Reviewed and updated as needed this visit by Provider         ROS:  Constitutional, HEENT, cardiovascular, pulmonary, gi and gu systems are negative, except as otherwise noted.    OBJECTIVE:     /70  Pulse 57  Temp 96.8  F (36  C) (Oral)  Wt 216 lb (98 kg)  SpO2 95%  BMI 33.33 kg/m2  Body mass index is 33.33 kg/(m^2).  GENERAL: healthy, alert and no distress  EYES: Eyes grossly normal to inspection, PERRL and conjunctivae and sclerae normal  NECK: no adenopathy, no asymmetry, masses, or scars and thyroid normal to palpation  RESP: lungs clear to auscultation - no rales, rhonchi or wheezes  CV: regular rate and rhythm, normal S1 S2, no S3 or S4,  no murmur, click or rub, no peripheral edema and peripheral pulses strong  NEURO: Normal strength and tone, mentation intact and speech normal  PSYCH: mentation appears normal, affect normal/bright  Diabetic foot exam: normal DP and PT pulses, no trophic changes or ulcerative lesions, reduced sensation at distal toes (mild vibration sense decreased)  Deformity: fallen arches and hammer toes    Diagnostic Test Results:  Results for orders placed or performed in visit on 01/30/18   Hemoglobin A1c   Result Value Ref Range    Hemoglobin A1C 6.2 (H) 4.3 - 6.0 %       ASSESSMENT/PLAN:          ICD-10-CM    1. Essential hypertension with goal blood pressure less than 140/90 I10    2. Sarcoidosis quiet D86.9    3. Hyperlipidemia LDL goal <100 E78.5 Lipid panel reflex to direct LDL Fasting     AST     ALT   4. Localization-related focal epilepsy with simple partial seizures (H) G40.109    5. Type 2 diabetes mellitus without complication, without long-term current use of insulin (H) E11.9 Albumin Random Urine Quantitative with Creat Ratio     Vitamin B12     Basic metabolic panel     Hemoglobin A1c   6. Edema of extremities R60.0 Basic metabolic panel      Discussed ongoing mammograms after age 76 at next visit (normal mammogram 7/2017).   rx written for new DM shoes.  I think her right second hammertoe is rubbing on the roof of her shoe:  Is due for new footwear.      Return to clinic 6 months for AFE./    Johana Pelayo MD  [facility]

## 2018-03-20 ENCOUNTER — TRANSFERRED RECORDS (OUTPATIENT)
Dept: HEALTH INFORMATION MANAGEMENT | Facility: CLINIC | Age: 78
End: 2018-03-20

## 2018-04-03 DIAGNOSIS — G40.109 LOCALIZATION-RELATED EPILEPSY (H): Primary | ICD-10-CM

## 2018-04-04 RX ORDER — LAMOTRIGINE 100 MG/1
100 TABLET ORAL 2 TIMES DAILY
Qty: 180 TABLET | Refills: 3
Start: 2018-04-04

## 2018-04-04 NOTE — TELEPHONE ENCOUNTER
Contacted patient about refill request for Lamictal. Notified her that Dr. Whitaker who was original prescriber does not work at [facility] anymore. She stated that Dr. Ryan will be taking care of her prescription.    Brandyn Neville RN....4/4/2018 1:39 PM

## 2018-05-31 DIAGNOSIS — E78.5 HYPERLIPIDEMIA LDL GOAL <100: ICD-10-CM

## 2018-05-31 NOTE — TELEPHONE ENCOUNTER
"Requested Prescriptions   Pending Prescriptions Disp Refills     simvastatin (ZOCOR) 20 MG tablet [Pharmacy Med Name: SIMVASTATIN 20MG TABLETS] 90 tablet 0    Last Written Prescription Date:  6-22-17  Last Fill Quantity: 90,  # refills: 3   Last office visit: 2/6/2018 with prescribing provider:     Future Office Visit:     Sig: TAKE 1 TABLET BY MOUTH AT BEDTIME    Statins Protocol Passed    5/31/2018  8:40 AM       Passed - LDL on file in past 12 months    Recent Labs   Lab Test  06/22/17 0658   LDL  83            Passed - No abnormal creatine kinase in past 12 months    Recent Labs   Lab Test  06/22/17 0658   CKT  60               Passed - Recent (12 mo) or future (30 days) visit within the authorizing provider's specialty    Patient had office visit in the last 12 months or has a visit in the next 30 days with authorizing provider or within the authorizing provider's specialty.  See \"Patient Info\" tab in inbasket, or \"Choose Columns\" in Meds & Orders section of the refill encounter.           Passed - Patient is age 18 or older       Passed - No active pregnancy on record       Passed - No positive pregnancy test in past 12 months          "

## 2018-06-01 RX ORDER — SIMVASTATIN 20 MG
TABLET ORAL
Qty: 90 TABLET | Refills: 0 | Status: SHIPPED | OUTPATIENT
Start: 2018-06-01 | End: 2018-08-29

## 2018-06-01 NOTE — TELEPHONE ENCOUNTER
See plan at last office visit 2/6/18:    Return to clinic 6 months for AFE./     Johana Pelayo MD  [facility]    Prescription approved per [facility] Refill Protocol.  Due for visit in August 2018.    Brigida Perez RN  [facility]

## 2018-07-02 ENCOUNTER — TELEPHONE (OUTPATIENT)
Dept: FAMILY MEDICINE | Facility: CLINIC | Age: 78
End: 2018-07-02

## 2018-07-02 DIAGNOSIS — E11.9 TYPE 2 DIABETES MELLITUS WITHOUT COMPLICATION, WITHOUT LONG-TERM CURRENT USE OF INSULIN (H): ICD-10-CM

## 2018-07-02 DIAGNOSIS — I10 ESSENTIAL HYPERTENSION WITH GOAL BLOOD PRESSURE LESS THAN 140/90: Chronic | ICD-10-CM

## 2018-07-03 DIAGNOSIS — R60.0 EDEMA OF EXTREMITIES: ICD-10-CM

## 2018-07-03 RX ORDER — LOSARTAN POTASSIUM 50 MG/1
TABLET ORAL
Qty: 60 TABLET | Refills: 0 | Status: SHIPPED | OUTPATIENT
Start: 2018-07-03 | End: 2018-08-19

## 2018-07-03 NOTE — TELEPHONE ENCOUNTER
"Requested Prescriptions   Pending Prescriptions Disp Refills     metFORMIN (GLUCOPHAGE) 500 MG tablet [Pharmacy Med Name: METFORMIN 500MG TABLETS] 270 tablet 0    Last Written Prescription Date:  6/22/17  Last Fill Quantity: 270,  # refills: 3   Last office visit: 2/6/2018 with prescribing provider:     Future Office Visit:     Sig: TAKE 1 TABLET(500 MG) BY MOUTH THREE TIMES DAILY WITH MEALS    Biguanide Agents Failed    7/2/2018  9:42 PM       Failed - Patient has documented LDL within the past 12 mos.    Recent Labs   Lab Test  06/22/17   0658   LDL  83            Failed - Patient has had a Microalbumin in the past 12 mos.    Recent Labs   Lab Test  11/02/16   0750   MICROL  5   UMALCR  8.09            Passed - Blood pressure less than 140/90 in past 6 months    BP Readings from Last 3 Encounters:   02/06/18 114/70   06/22/17 131/68   04/19/17 130/70                Passed - Patient is age 10 or older       Passed - Patient has documented A1c within the specified period of time.    If HgbA1C is 8 or greater, it needs to be on file within the past 3 months.  If less than 8, must be on file within the past 6 months.     Recent Labs   Lab Test  01/30/18   0910   A1C  6.2*            Passed - Patient's CR is NOT>1.4 OR Patient's EGFR is NOT<45 within past 12 mos.    Recent Labs   Lab Test  06/22/17   0658   GFRESTIMATED  66   GFRESTBLACK  79       Recent Labs   Lab Test  06/22/17   0658   CR  0.84            Passed - Patient does NOT have a diagnosis of CHF.       Passed - Patient is not pregnant       Passed - Patient has not had a positive pregnancy test within the past 12 mos.        Passed - Recent (6 mo) or future (30 days) visit within the authorizing provider's specialty    Patient had office visit in the last 6 months or has a visit in the next 30 days with authorizing provider or within the authorizing provider's specialty.  See \"Patient Info\" tab in inbasket, or \"Choose Columns\" in Meds & Orders section of " "the refill encounter.            losartan (COZAAR) 50 MG tablet [Pharmacy Med Name: LOSARTAN 50MG TABLETS] 180 tablet 0    Last Written Prescription Date:  6/22/17  Last Fill Quantity: 180,  # refills: 3   Last office visit: 2/6/2018 with prescribing provider:     Future Office Visit:     Sig: TAKE 1 TABLET(50 MG) BY MOUTH TWICE DAILY    Angiotensin-II Receptors Failed    7/2/2018  9:42 PM       Failed - Normal serum creatinine on file in past 12 months    Recent Labs   Lab Test  06/22/17   0658   CR  0.84            Failed - Normal serum potassium on file in past 12 months    Recent Labs   Lab Test  06/22/17   0658   POTASSIUM  4.4                   Passed - Blood pressure under 140/90 in past 12 months    BP Readings from Last 3 Encounters:   02/06/18 114/70   06/22/17 131/68   04/19/17 130/70                Passed - Recent (12 mo) or future (30 days) visit within the authorizing provider's specialty    Patient had office visit in the last 12 months or has a visit in the next 30 days with authorizing provider or within the authorizing provider's specialty.  See \"Patient Info\" tab in inbasket, or \"Choose Columns\" in Meds & Orders section of the refill encounter.           Passed - Patient is age 18 or older       Passed - No active pregnancy on record       Passed - No positive pregnancy test in past 12 months          "

## 2018-07-03 NOTE — TELEPHONE ENCOUNTER
"Requested Prescriptions   Pending Prescriptions Disp Refills     furosemide (LASIX) 40 MG tablet [Pharmacy Med Name: FUROSEMIDE 40MG TABLETS] 90 tablet 0    Last Written Prescription Date:  6/22/17  Last Fill Quantity: 90,  # refills: 3   Last office visit: 2/6/2018 with prescribing provider:     Future Office Visit:     Sig: TAKE 1 TABLET(40 MG) BY MOUTH DAILY    Diuretics (Including Combos) Protocol Failed    7/3/2018  2:44 PM       Failed - Normal serum creatinine on file in past 12 months    Recent Labs   Lab Test  06/22/17   0658   CR  0.84             Failed - Normal serum potassium on file in past 12 months    Recent Labs   Lab Test  06/22/17   0658   POTASSIUM  4.4                   Failed - Normal serum sodium on file in past 12 months    Recent Labs   Lab Test  06/22/17   0658   NA  136             Passed - Blood pressure under 140/90 in past 12 months    BP Readings from Last 3 Encounters:   02/06/18 114/70   06/22/17 131/68   04/19/17 130/70                Passed - Recent (12 mo) or future (30 days) visit within the authorizing provider's specialty    Patient had office visit in the last 12 months or has a visit in the next 30 days with authorizing provider or within the authorizing provider's specialty.  See \"Patient Info\" tab in inbasket, or \"Choose Columns\" in Meds & Orders section of the refill encounter.           Passed - Patient is age 18 or older       Passed - No active pregancy on record       Passed - No positive pregnancy test in past 12 months          "

## 2018-07-04 NOTE — TELEPHONE ENCOUNTER
Patient due back for physical in August per plan at 2/6/18 visit.    Prescription approved per [facility] Refill Protocol.    Encounter routed to team to reach out to patient to schedule annual physical.    Brigida Perez RN  [facility]

## 2018-07-05 RX ORDER — FUROSEMIDE 40 MG
TABLET ORAL
Qty: 90 TABLET | Refills: 1 | Status: SHIPPED | OUTPATIENT
Start: 2018-07-05 | End: 2018-10-11

## 2018-07-30 DIAGNOSIS — I10 ESSENTIAL HYPERTENSION WITH GOAL BLOOD PRESSURE LESS THAN 140/90: Chronic | ICD-10-CM

## 2018-07-30 DIAGNOSIS — R60.0 EDEMA OF EXTREMITIES: ICD-10-CM

## 2018-07-31 RX ORDER — POTASSIUM CHLORIDE 1500 MG/1
TABLET, EXTENDED RELEASE ORAL
Qty: 90 TABLET | Refills: 0 | Status: SHIPPED | OUTPATIENT
Start: 2018-07-31 | End: 2018-10-11 | Stop reason: DRUGHIGH

## 2018-07-31 NOTE — TELEPHONE ENCOUNTER
"Requested Prescriptions   Pending Prescriptions Disp Refills     potassium chloride SA (K-DUR/KLOR-CON M) 20 MEQ CR tablet [Pharmacy Med Name: POTASSIUM CL 20MEQ ER TABLETS] 90 tablet 0    Last Written Prescription Date:  6/22/17  Last Fill Quantity: 90,  # refills: 3   Last office visit: 2/6/2018 with prescribing provider:     Future Office Visit:   Next 5 appointments (look out 90 days)     Aug 09, 2018  8:40 AM CDT   PHYSICAL with Johana Pelayo MD   [facility]    [address] [place] [id number]   [phone]                  Sig: TAKE 1 TABLET BY MOUTH DAILY    Potassium Supplements Protocol Failed    7/30/2018  9:45 PM       Failed - Normal serum potassium in past 12 months    Recent Labs   Lab Test  06/22/17   0658   POTASSIUM  4.4                   Passed - Recent (12 mo) or future (30 days) visit within the authorizing provider's specialty    Patient had office visit in the last 12 months or has a visit in the next 30 days with authorizing provider or within the authorizing provider's specialty.  See \"Patient Info\" tab in inbasket, or \"Choose Columns\" in Meds & Orders section of the refill encounter.           Passed - Patient is age 18 or older          "

## 2018-07-31 NOTE — TELEPHONE ENCOUNTER
Routing refill request to provider for review/approval because:  Labs not current:  SUREKHA Byrd RN  [facility]

## 2018-08-03 DIAGNOSIS — E78.5 HYPERLIPIDEMIA LDL GOAL <100: ICD-10-CM

## 2018-08-03 DIAGNOSIS — R60.0 EDEMA OF EXTREMITIES: ICD-10-CM

## 2018-08-03 DIAGNOSIS — E11.9 TYPE 2 DIABETES MELLITUS WITHOUT COMPLICATION, WITHOUT LONG-TERM CURRENT USE OF INSULIN (H): ICD-10-CM

## 2018-08-03 LAB
ALT SERPL W P-5'-P-CCNC: 27 U/L (ref 0–50)
ANION GAP SERPL CALCULATED.3IONS-SCNC: 5 MMOL/L (ref 3–14)
AST SERPL W P-5'-P-CCNC: 18 U/L (ref 0–45)
BUN SERPL-MCNC: 14 MG/DL (ref 7–30)
CALCIUM SERPL-MCNC: 9.8 MG/DL (ref 8.5–10.1)
CHLORIDE SERPL-SCNC: 100 MMOL/L (ref 94–109)
CHOLEST SERPL-MCNC: 178 MG/DL
CO2 SERPL-SCNC: 31 MMOL/L (ref 20–32)
CREAT SERPL-MCNC: 0.87 MG/DL (ref 0.52–1.04)
GFR SERPL CREATININE-BSD FRML MDRD: 63 ML/MIN/1.7M2
GLUCOSE SERPL-MCNC: 142 MG/DL (ref 70–99)
HBA1C MFR BLD: 6.3 % (ref 0–5.6)
HDLC SERPL-MCNC: 69 MG/DL
LDLC SERPL CALC-MCNC: 92 MG/DL
NONHDLC SERPL-MCNC: 109 MG/DL
POTASSIUM SERPL-SCNC: 4.4 MMOL/L (ref 3.4–5.3)
SODIUM SERPL-SCNC: 136 MMOL/L (ref 133–144)
TRIGL SERPL-MCNC: 85 MG/DL
VIT B12 SERPL-MCNC: 697 PG/ML (ref 193–986)

## 2018-08-03 PROCEDURE — 84460 ALANINE AMINO (ALT) (SGPT): CPT | Performed by: INTERNAL MEDICINE

## 2018-08-03 PROCEDURE — 36415 COLL VENOUS BLD VENIPUNCTURE: CPT | Performed by: INTERNAL MEDICINE

## 2018-08-03 PROCEDURE — 83036 HEMOGLOBIN GLYCOSYLATED A1C: CPT | Performed by: INTERNAL MEDICINE

## 2018-08-03 PROCEDURE — 82607 VITAMIN B-12: CPT | Performed by: INTERNAL MEDICINE

## 2018-08-03 PROCEDURE — 80048 BASIC METABOLIC PNL TOTAL CA: CPT | Performed by: INTERNAL MEDICINE

## 2018-08-03 PROCEDURE — 80061 LIPID PANEL: CPT | Performed by: INTERNAL MEDICINE

## 2018-08-03 PROCEDURE — 84450 TRANSFERASE (AST) (SGOT): CPT | Performed by: INTERNAL MEDICINE

## 2018-08-03 NOTE — LETTER
[facility]   [address] [place]  [address]  [phone]                                   August 6, 2018    Phyllis Aleman  [address]  [facility] [place] [facility] [place] [address]        Dear Phyllis,    Enclosed are your results.  Your labs are normal/stable at this time.     Please call  with any questions.  We can also discuss any questions regarding these labs at your next scheduled visit.     Results for orders placed or performed in visit on 08/03/18   Vitamin B12   Result Value Ref Range    Vitamin B12 697 193 - 986 pg/mL   Basic metabolic panel   Result Value Ref Range    Sodium 136 133 - 144 mmol/L    Potassium 4.4 3.4 - 5.3 mmol/L    Chloride 100 94 - 109 mmol/L    Carbon Dioxide 31 20 - 32 mmol/L    Anion Gap 5 3 - 14 mmol/L    Glucose 142 (H) 70 - 99 mg/dL    Urea Nitrogen 14 7 - 30 mg/dL    Creatinine 0.87 0.52 - 1.04 mg/dL    GFR Estimate 63 >60 mL/min/1.7m2    GFR Estimate If Black 76 >60 mL/min/1.7m2    Calcium 9.8 8.5 - 10.1 mg/dL   Lipid panel reflex to direct LDL Fasting   Result Value Ref Range    Cholesterol 178 <200 mg/dL    Triglycerides 85 <150 mg/dL    HDL Cholesterol 69 >49 mg/dL    LDL Cholesterol Calculated 92 <100 mg/dL    Non HDL Cholesterol 109 <130 mg/dL   Hemoglobin A1c   Result Value Ref Range    Hemoglobin A1C 6.3 (H) 0 - 5.6 %   AST   Result Value Ref Range    AST 18 0 - 45 U/L   ALT   Result Value Ref Range    ALT 27 0 - 50 U/L       If you have any questions please call the clinic at [phone]    Sincerely,    Johana Pelayo MD  [facility]

## 2018-08-05 NOTE — PROGRESS NOTES
Phyllis Aleman      Enclosed are your results.  Your labs are normal/stable at this time.     Please call  with any questions.  We can also discuss any questions regarding these labs at your next scheduled visit.    Sincerely,    Johana Pelayo M.D.

## 2018-08-06 PROBLEM — E66.01 SEVERE OBESITY (BMI 35.0-35.9 WITH COMORBIDITY) (H): Status: ACTIVE | Noted: 2018-08-06

## 2018-08-19 DIAGNOSIS — E11.9 TYPE 2 DIABETES MELLITUS WITHOUT COMPLICATION, WITHOUT LONG-TERM CURRENT USE OF INSULIN (H): ICD-10-CM

## 2018-08-19 DIAGNOSIS — I10 ESSENTIAL HYPERTENSION WITH GOAL BLOOD PRESSURE LESS THAN 140/90: Chronic | ICD-10-CM

## 2018-08-20 RX ORDER — LOSARTAN POTASSIUM 50 MG/1
TABLET ORAL
Qty: 60 TABLET | Refills: 3 | Status: SHIPPED | OUTPATIENT
Start: 2018-08-20 | End: 2018-10-04

## 2018-08-20 NOTE — TELEPHONE ENCOUNTER
"Requested Prescriptions   Pending Prescriptions Disp Refills     losartan (COZAAR) 50 MG tablet [Pharmacy Med Name: LOSARTAN 50MG TABLETS] 60 tablet 0    Last Written Prescription Date:  7/3/18  Last Fill Quantity: 60,  # refills: 0   Last office visit: 2/6/2018 with prescribing provider:     Future Office Visit:   Next 5 appointments (look out 90 days)     Aug 30, 2018  8:20 AM CDT   PHYSICAL with Johana Pelayo MD   [facility] ([facility])    [address] [place] [id number] [phone]                  Sig: TAKE 1 TABLET BY MOUTH TWICE DAILY    Angiotensin-II Receptors Passed    8/19/2018  2:30 PM       Passed - Blood pressure under 140/90 in past 12 months    BP Readings from Last 3 Encounters:   02/06/18 114/70   06/22/17 131/68   04/19/17 130/70                Passed - Recent (12 mo) or future (30 days) visit within the authorizing provider's specialty    Patient had office visit in the last 12 months or has a visit in the next 30 days with authorizing provider or within the authorizing provider's specialty.  See \"Patient Info\" tab in inbasket, or \"Choose Columns\" in Meds & Orders section of the refill encounter.           Passed - Patient is age 18 or older       Passed - No active pregnancy on record       Passed - Normal serum creatinine on file in past 12 months    Recent Labs   Lab Test  08/03/18   0751   CR  0.87            Passed - Normal serum potassium on file in past 12 months    Recent Labs   Lab Test  08/03/18   0751   POTASSIUM  4.4                   Passed - No positive pregnancy test in past 12 months        metFORMIN (GLUCOPHAGE) 500 MG tablet [Pharmacy Med Name: METFORMIN 500MG TABLETS] 90 tablet 0    Last Written Prescription Date:  7/3/18  Last Fill Quantity: 90,  # refills: 0   Last office visit: 2/6/2018 with prescribing provider:     Future Office Visit:   Next 5 appointments (look out 90 days)     Aug 30, " "2018  8:20 AM CDT   PHYSICAL with Johana Pelayo MD   [facility] ([facility])    [address] [facility] [address] [phone]                  Sig: TAKE 1 TABLET BY MOUTH THREE TIMES DAILY WITH MEALS    Biguanide Agents Failed    8/19/2018  2:30 PM       Failed - Blood pressure less than 140/90 in past 6 months    BP Readings from Last 3 Encounters:   02/06/18 114/70   06/22/17 131/68   04/19/17 130/70                Failed - Patient has had a Microalbumin in the past 12 mos.    Recent Labs   Lab Test  11/02/16   0750   MICROL  5   UMALCR  8.09            Passed - Patient has documented LDL within the past 12 mos.    Recent Labs   Lab Test  08/03/18   0751   LDL  92            Passed - Patient is age 10 or older       Passed - Patient has documented A1c within the specified period of time.    If HgbA1C is 8 or greater, it needs to be on file within the past 3 months.  If less than 8, must be on file within the past 6 months.     Recent Labs   Lab Test  08/03/18   0751   A1C  6.3*            Passed - Patient's CR is NOT>1.4 OR Patient's EGFR is NOT<45 within past 12 mos.    Recent Labs   Lab Test  08/03/18   0751   GFRESTIMATED  63   GFRESTBLACK  76       Recent Labs   Lab Test  08/03/18   0751   CR  0.87            Passed - Patient does NOT have a diagnosis of CHF.       Passed - Patient is not pregnant       Passed - Patient has not had a positive pregnancy test within the past 12 mos.        Passed - Recent (6 mo) or future (30 days) visit within the authorizing provider's specialty    Patient had office visit in the last 6 months or has a visit in the next 30 days with authorizing provider or within the authorizing provider's specialty.  See \"Patient Info\" tab in inbasket, or \"Choose Columns\" in Meds & Orders section of the refill encounter.              "

## 2018-08-20 NOTE — TELEPHONE ENCOUNTER
Routing refill request to provider for review/approval because:  Failed protocol below.  Julienne Miguel RN [facility] Triage.

## 2018-08-29 DIAGNOSIS — E78.5 HYPERLIPIDEMIA LDL GOAL <100: ICD-10-CM

## 2018-08-29 NOTE — TELEPHONE ENCOUNTER
"Requested Prescriptions   Pending Prescriptions Disp Refills     simvastatin (ZOCOR) 20 MG tablet [Pharmacy Med Name: SIMVASTATIN 20MG TABLETS] 90 tablet 0    Last Written Prescription Date:  6/1/18  Last Fill Quantity: 90,  # refills: 0   Last office visit: 2/6/2018 with prescribing provider:     Future Office Visit:   Next 5 appointments (look out 90 days)     Sep 06, 2018  6:40 AM CDT   PHYSICAL with Johana Pelayo MD   [facility]    [address] [place] [id number] [phone]                  Sig: TAKE 1 TABLET BY MOUTH AT BEDTIME    Statins Protocol Passed    8/29/2018  9:11 AM       Passed - LDL on file in past 12 months    Recent Labs   Lab Test  08/03/18   0751   LDL  92            Passed - No abnormal creatine kinase in past 12 months    Recent Labs   Lab Test  06/22/17   0658   CKT  60               Passed - Recent (12 mo) or future (30 days) visit within the authorizing provider's specialty    Patient had office visit in the last 12 months or has a visit in the next 30 days with authorizing provider or within the authorizing provider's specialty.  See \"Patient Info\" tab in inbasket, or \"Choose Columns\" in Meds & Orders section of the refill encounter.           Passed - Patient is age 18 or older       Passed - No active pregnancy on record       Passed - No positive pregnancy test in past 12 months          "

## 2018-08-30 RX ORDER — SIMVASTATIN 20 MG
TABLET ORAL
Qty: 90 TABLET | Refills: 1 | Status: SHIPPED | OUTPATIENT
Start: 2018-08-30 | End: 2019-02-23

## 2018-10-04 DIAGNOSIS — E11.9 TYPE 2 DIABETES MELLITUS WITHOUT COMPLICATION, WITHOUT LONG-TERM CURRENT USE OF INSULIN (H): ICD-10-CM

## 2018-10-04 DIAGNOSIS — I10 ESSENTIAL HYPERTENSION WITH GOAL BLOOD PRESSURE LESS THAN 140/90: Chronic | ICD-10-CM

## 2018-10-04 NOTE — TELEPHONE ENCOUNTER
"Requested Prescriptions   Pending Prescriptions Disp Refills     losartan (COZAAR) 50 MG tablet [Pharmacy Med Name: LOSARTAN 50MG TABLETS] 90 tablet 3    Last Written Prescription Date:  8/20/18  Last Fill Quantity: 60,  # refills: 3   Last office visit: 2/6/2018 with prescribing provider:     Future Office Visit:   Next 5 appointments (look out 90 days)     Oct 11, 2018  5:00 PM CDT   PHYSICAL with Johana Pelayo MD   [facility]    [address] [place] [id number] [phone]                  Sig: TAKE 1 TABLET BY MOUTH TWICE DAILY    Angiotensin-II Receptors Passed    10/4/2018 12:59 PM       Passed - Blood pressure under 140/90 in past 12 months    BP Readings from Last 3 Encounters:   02/06/18 114/70   06/22/17 131/68   04/19/17 130/70                Passed - Recent (12 mo) or future (30 days) visit within the authorizing provider's specialty    Patient had office visit in the last 12 months or has a visit in the next 30 days with authorizing provider or within the authorizing provider's specialty.  See \"Patient Info\" tab in inbasket, or \"Choose Columns\" in Meds & Orders section of the refill encounter.           Passed - Patient is age 18 or older       Passed - No active pregnancy on record       Passed - Normal serum creatinine on file in past 12 months    Recent Labs   Lab Test  08/03/18   0751   CR  0.87            Passed - Normal serum potassium on file in past 12 months    Recent Labs   Lab Test  08/03/18   0751   POTASSIUM  4.4                   Passed - No positive pregnancy test in past 12 months          "

## 2018-10-05 RX ORDER — LOSARTAN POTASSIUM 50 MG/1
TABLET ORAL
Qty: 90 TABLET | Refills: 3 | Status: SHIPPED | OUTPATIENT
Start: 2018-10-05 | End: 2018-12-12

## 2018-10-05 NOTE — TELEPHONE ENCOUNTER
Prescription approved per [facility].  Lilliam Gonzalez RN

## 2018-10-11 ENCOUNTER — RADIANT APPOINTMENT (OUTPATIENT)
Dept: GENERAL RADIOLOGY | Facility: CLINIC | Age: 78
End: 2018-10-11
Attending: INTERNAL MEDICINE
Payer: MEDICARE

## 2018-10-11 ENCOUNTER — OFFICE VISIT (OUTPATIENT)
Dept: FAMILY MEDICINE | Facility: CLINIC | Age: 78
End: 2018-10-11
Payer: MEDICARE

## 2018-10-11 VITALS
OXYGEN SATURATION: 96 % | HEART RATE: 87 BPM | WEIGHT: 205 LBS | SYSTOLIC BLOOD PRESSURE: 124 MMHG | HEIGHT: 67 IN | TEMPERATURE: 96.3 F | BODY MASS INDEX: 32.18 KG/M2 | DIASTOLIC BLOOD PRESSURE: 71 MMHG

## 2018-10-11 DIAGNOSIS — D86.9 SARCOIDOSIS: ICD-10-CM

## 2018-10-11 DIAGNOSIS — R63.4 WEIGHT LOSS: ICD-10-CM

## 2018-10-11 DIAGNOSIS — E66.01 SEVERE OBESITY (BMI 35.0-35.9 WITH COMORBIDITY) (H): ICD-10-CM

## 2018-10-11 DIAGNOSIS — Z12.11 SCREEN FOR COLON CANCER: ICD-10-CM

## 2018-10-11 DIAGNOSIS — Z23 NEED FOR SHINGLES VACCINE: ICD-10-CM

## 2018-10-11 DIAGNOSIS — Z23 NEED FOR PROPHYLACTIC VACCINATION AND INOCULATION AGAINST INFLUENZA: ICD-10-CM

## 2018-10-11 DIAGNOSIS — Z00.00 ROUTINE GENERAL MEDICAL EXAMINATION AT A HEALTH CARE FACILITY: Primary | ICD-10-CM

## 2018-10-11 DIAGNOSIS — G40.109 LOCALIZATION-RELATED FOCAL EPILEPSY WITH SIMPLE PARTIAL SEIZURES (H): ICD-10-CM

## 2018-10-11 DIAGNOSIS — E11.9 TYPE 2 DIABETES MELLITUS WITHOUT COMPLICATION, WITHOUT LONG-TERM CURRENT USE OF INSULIN (H): ICD-10-CM

## 2018-10-11 DIAGNOSIS — R60.0 EDEMA OF EXTREMITIES: ICD-10-CM

## 2018-10-11 PROCEDURE — 90662 IIV NO PRSV INCREASED AG IM: CPT | Performed by: INTERNAL MEDICINE

## 2018-10-11 PROCEDURE — G0439 PPPS, SUBSEQ VISIT: HCPCS | Performed by: INTERNAL MEDICINE

## 2018-10-11 PROCEDURE — 71046 X-RAY EXAM CHEST 2 VIEWS: CPT | Mod: FY

## 2018-10-11 PROCEDURE — G0008 ADMIN INFLUENZA VIRUS VAC: HCPCS | Performed by: INTERNAL MEDICINE

## 2018-10-11 PROCEDURE — 99213 OFFICE O/P EST LOW 20 MIN: CPT | Mod: 25 | Performed by: INTERNAL MEDICINE

## 2018-10-11 RX ORDER — FUROSEMIDE 40 MG
20 TABLET ORAL DAILY
Qty: 90 TABLET | Refills: 1 | Status: SHIPPED | OUTPATIENT
Start: 2018-10-11

## 2018-10-11 ASSESSMENT — ENCOUNTER SYMPTOMS
PARESTHESIAS: 0
EYE PAIN: 0
NERVOUS/ANXIOUS: 0
FREQUENCY: 0
CHILLS: 0
JOINT SWELLING: 0
HEADACHES: 0
DYSURIA: 0
HEARTBURN: 0
ABDOMINAL PAIN: 0
BREAST MASS: 0
CONSTIPATION: 0
HEMATOCHEZIA: 0
WEAKNESS: 0
ARTHRALGIAS: 0
HEMATURIA: 0
NAUSEA: 0
DIARRHEA: 0
SHORTNESS OF BREATH: 0
MYALGIAS: 0
PALPITATIONS: 0
SORE THROAT: 0
COUGH: 0
FEVER: 0
DIZZINESS: 0

## 2018-10-11 ASSESSMENT — ACTIVITIES OF DAILY LIVING (ADL)
I_NEED_ASSISTANCE_FOR_THE_FOLLOWING_DAILY_ACTIVITIES:: NO ASSISTANCE IS NEEDED
CURRENT_FUNCTION: NO ASSISTANCE NEEDED

## 2018-10-11 NOTE — PROGRESS NOTES
Injectable Influenza Immunization Documentation    1.  Is the person to be vaccinated sick today?   No    2. Does the person to be vaccinated have an allergy to a component   of the vaccine?   No  Egg Allergy Algorithm Link    3. Has the person to be vaccinated ever had a serious reaction   to influenza vaccine in the past?   No    4. Has the person to be vaccinated ever had Guillain-Barré syndrome?   No    Due to injection administration, patient instructed to remain in clinic for 15 minutes  afterwards, and to report any adverse reaction to me immediately.    Vaccine information supplied.     Form completed by ibeatyou MA

## 2018-10-11 NOTE — LETTER
[facility]  [address] [place]  [address]  [phone]        October 12, 2018    Phyllis Aleman  [address]  [address]  [facility] [id number]        Dear Phyllis,    No change in lung infiltrates.  Keep this report in case you are in an ER somewhere out of town.     Results for orders placed or performed in visit on 10/11/18   XR Chest 2 Views    Narrative    XR CHEST 2 VW 10/11/2018 6:02 PM    HISTORY: Sarcoidosis.     COMPARISON: May 12, 2014.       Impression    IMPRESSION: Bilateral hilar adenopathy as before. The lungs remain  clear. No pleural effusions or pneumothorax. Stable heart size.     LUZ ELENA JAUREGUI MD       If you have any questions please call the clinic at [phone].    Sincerely,    Johana CORLEY

## 2018-10-11 NOTE — MR AVS SNAPSHOT
After Visit Summary   10/11/2018    Phyllis Aleman    MRN: [id number]           Patient Information     Date Of Birth          1940        Visit Information        Provider Department      10/11/2018 5:00 PM Johana Pelayo MD [facility]        Today's Diagnoses     Routine general medical examination at a health care facility    -  1    Type 2 diabetes mellitus without complication, without long-term current use of insulin (H)        Localization-related focal epilepsy with simple partial seizures (H)        Sarcoidosis quiet        Overweight HCC        Screen for colon cancer        Edema of extremities        Weight loss        Need for shingles vaccine          Care Instructions      Preventive Health Recommendations    Female Ages 65 +    Yearly exam:     See your health care provider every year in order to  o Review health changes.   o Discuss preventive care.    o Review your medicines if your doctor has prescribed any.      You no longer need a yearly Pap test unless you've had an abnormal Pap test in the past 10 years. If you have vaginal symptoms, such as bleeding or discharge, be sure to talk with your provider about a Pap test.      Every 1 to 2 years, have a mammogram.  If you are over 69, talk with your health care provider about whether or not you want to continue having screening mammograms.      Every 10 years, have a colonoscopy. Or, have a yearly FIT test (stool test). These exams will check for colon cancer.       Have a cholesterol test every 5 years, or more often if your doctor advises it.       Have a diabetes test (fasting glucose) every three years. If you are at risk for diabetes, you should have this test more often.       At age 65, have a bone density scan (DEXA) to check for osteoporosis (brittle bone disease).    Shots:    Get a flu shot each year.    Get a tetanus shot every 10 years.    Talk to your doctor about your pneumonia  "vaccines. There are now two you should receive - Pneumovax (PPSV 23) and Prevnar (PCV 13).    Talk to your pharmacist about the shingles vaccine.    Talk to your doctor about the hepatitis B vaccine.    Nutrition:     Eat at least 5 servings of fruits and vegetables each day.      Eat whole-grain bread, whole-wheat pasta and brown rice instead of white grains and rice.      Get adequate Calcium and Vitamin D.     Lifestyle    Exercise at least 150 minutes a week (30 minutes a day, 5 days a week). This will help you control your weight and prevent disease.      Limit alcohol to one drink per day.      No smoking.       Wear sunscreen to prevent skin cancer.       See your dentist twice a year for an exam and cleaning.      See your eye doctor every 1 to 2 years to screen for conditions such as glaucoma, macular degeneration and cataracts.      Reduce metformin to 500 mg (1 tab) at supper .     Reduce Lasix (furosemide) to 20 mg daily (1/2 of your 40 mg tab)  Stop Potassium    Check labs in mid November.      Colonoscopy will call you.  .    Schedule a shingrix (shingles vaccine) - make an \"Ancillary appointment \"... You would pay for it on the way in at our pharmacy.               Follow-ups after your visit        Additional Services     GASTROENTEROLOGY ADULT REF PROCEDURE ONLY Other       Last Lab Result: Creatinine (mg/dL)       Date                     Value                 08/03/2018               0.87             ----------  Body mass index is 32.11 kg/(m^2).     Needed:  No  Language:  English    Patient will be contacted to schedule procedure.     Please be aware that coverage of these services is subject to the terms and limitations of your health insurance plan.  Call member services at your health plan with any benefit or coverage questions.  Any procedures must be performed at a [place] facility OR coordinated by your clinic's referral office.    Please bring the following with you to your " appointment:    (1) Any X-Rays, CTs or MRIs which have been performed.  Contact the facility where they were done to arrange for  prior to your scheduled appointment.    (2) List of current medications   (3) This referral request   (4) Any documents/labs given to you for this referral                  Follow-up notes from your care team     Return in about 1 year (around 10/11/2019) for Physical Exam.      Your next 10 appointments already scheduled     Feb 08, 2019 10:00 AM CST   New Visit with Shirley Edwards MD   [facility]    [address] [place] [id number] [phone]              Future tests that were ordered for you today     Open Future Orders        Priority Expected Expires Ordered    Albumin Random Urine Quantitative with Creat Ratio Routine  10/11/2019 10/11/2018    Hemoglobin A1c Routine  10/10/2019 10/11/2018    XR Chest 2 Views Routine 10/11/2018 10/10/2019 10/11/2018    Basic metabolic panel Routine  10/10/2019 10/11/2018            Who to contact     If you have questions or need follow up information about today's clinic visit or your schedule please contact [facility] directly at [phone].  Normal or non-critical lab and imaging results will be communicated to you by MyChart, letter or phone within 4 business days after the clinic has received the results. If you do not hear from us within 7 days, please contact the clinic through MyChart or phone. If you have a critical or abnormal lab result, we will notify you by phone as soon as possible.  Submit refill requests through [employer] or call your pharmacy and they will forward the refill request to us. Please allow 3 business days for your refill to be completed.          Additional Information About Your Visit        Care EveryWhere ID     This is your Care EveryWhere ID. This could be used by other organizations to access your [facility]  "records  [id number]        Your Vitals Were     Pulse Temperature Height Pulse Oximetry Breastfeeding? BMI (Body Mass Index)    87 96.3  F (35.7  C) (Oral) 5' 7\" (1.702 m) 96% No 32.11 kg/m2       Blood Pressure from Last 3 Encounters:   10/11/18 124/71   02/06/18 114/70   06/22/17 131/68    Weight from Last 3 Encounters:   10/11/18 205 lb (93 kg)   02/06/18 216 lb (98 kg)   06/22/17 227 lb (103 kg)              We Performed the Following     GASTROENTEROLOGY ADULT REF PROCEDURE ONLY Other          Today's Medication Changes          These changes are accurate as of 10/11/18  5:52 PM.  If you have any questions, ask your nurse or doctor.               Start taking these medicines.        Dose/Directions    zoster vaccine recombinant adjuvanted injection   Commonly known as:  SHINGRIX   Used for:  Need for shingles vaccine   Started by:  Johana Pelayo MD        Dose:  1 each   Inject 0.5 mLs into the muscle once for 1 dose   Quantity:  0.5 mL   Refills:  1         These medicines have changed or have updated prescriptions.        Dose/Directions    furosemide 40 MG tablet   Commonly known as:  LASIX   This may have changed:  See the new instructions.   Used for:  Edema of extremities   Changed by:  Johana Pelayo MD        Dose:  20 mg   Take 0.5 tablets (20 mg) by mouth daily   Quantity:  90 tablet   Refills:  1       metFORMIN 500 MG tablet   Commonly known as:  GLUCOPHAGE   This may have changed:  See the new instructions.   Used for:  Type 2 diabetes mellitus without complication, without long-term current use of insulin (H)   Changed by:  Johana Pelayo MD        Dose:  500 mg   Take 1 tablet (500 mg) by mouth daily (with dinner)   Quantity:  90 tablet   Refills:  3         Stop taking these medicines if you haven't already. Please contact your care team if you have questions.     potassium chloride SA 20 MEQ CR tablet   Commonly known as:  K-DUR/KLOR-CON M   Stopped by:  Johana Pelayo" MD CAROL                Where to get your medicines      These medications [place] [facility] [place] [place] [address]  [address], [facility] [address]     Phone:  [phone]     furosemide 40 MG tablet    metFORMIN 500 MG tablet    zoster vaccine recombinant adjuvanted injection                Primary Care Provider Office Phone # Fax #    Johana Pelayo [phone] [address] [facility] [address]        Equal Access to Services     PETR BLANK : Hadii aad ku hadasho Soomaali, waaxda luqadaha, qaybta kaalmada adeegyada, waxay idiin hayaan adeeg kharayolanda larosalino . So [facility] [phone].    ATENCIÓN: Si habla español, tiene a haque disposición servicios gratuitos de asistencia lingüística. Ll[facility] [phone].    We comply with applicable federal civil rights laws and [place] laws. We do not discriminate on the basis of race, color, national origin, age, disability, sex, sexual orientation, or gender identity.            Thank you!     Thank you for choosing [facility]  for your care. Our goal is always to provide you with excellent care. Hearing back from our patients is one way we can continue to improve our services. Please take a few minutes to complete the written survey that you may receive in the mail after your visit with us. Thank you!             Your Updated Medication List - Protect others around you: Learn how to safely use, store and throw away your medicines at www.disposemymeds.org.          This list is accurate as of 10/11/18  5:52 PM.  Always use your most recent med list.                   Brand Name Dispense Instructions for use Diagnosis    aspirin 81 MG tablet      Take 1 tablet by mouth daily.    Diabetes       blood glucose monitoring lancets     100 each    Use to test blood sugar 1 times daily or as directed.    Type 2 diabetes mellitus without complication,  without long-term current use of insulin (H)       blood glucose monitoring test strip    ACCU-CHEK ANA PLUS    1 Box    Use to test blood sugar 1 times daily or as directed.    Type 2 diabetes mellitus without complication, without long-term current use of insulin (H)       CENTRUM SILVER ULTRA WOMENS PO     30    Take  by mouth daily.    Preventative health care       furosemide 40 MG tablet    LASIX    90 tablet    Take 0.5 tablets (20 mg) by mouth daily    Edema of extremities       lamoTRIgine 100 MG tablet    LaMICtal    180 tablet    Take 1 tablet (100 mg) by mouth 2 times daily        losartan 50 MG tablet    COZAAR    90 tablet    TAKE 1 TABLET BY MOUTH TWICE DAILY    Type 2 diabetes mellitus without complication, without long-term current use of insulin (H), Essential hypertension with goal blood pressure less than 140/90       metFORMIN 500 MG tablet    GLUCOPHAGE    90 tablet    Take 1 tablet (500 mg) by mouth daily (with dinner)    Type 2 diabetes mellitus without complication, without long-term current use of insulin (H)       order for DME     1 each    Equipment being ordered: 1 pair diabetic shoes.    Type 2 diabetes mellitus without complication, without long-term current use of insulin (H)       simvastatin 20 MG tablet    ZOCOR    90 tablet    TAKE 1 TABLET BY MOUTH AT BEDTIME    Hyperlipidemia LDL goal <100       ZANTAC 150 MG tablet   Generic drug:  ranitidine      Take  by mouth. As needed        zoster vaccine recombinant adjuvanted injection    SHINGRIX    0.5 mL    Inject 0.5 mLs into the muscle once for 1 dose    Need for shingles vaccine

## 2018-10-11 NOTE — PROGRESS NOTES
SUBJECTIVE:   Phyllis Aleman is a 77 year old female who presents for Preventive Visit.    75 y/o WF with uncomplicated DM II, sz disorder (Lamictal), quiet sarcoidosis here for AFE.  No recent seizures.    BS have been 120-150.   Recent (8/2018) labs show normal BMP,FLP and A1C at 6.3...      Weight loss via portion control.  Has lost 55# over 4 years.       Physical   Annual:     Getting at least 3 servings of Calcium per day:  NO    Bi-annual eye exam:  Yes    Dental care twice a year:  Yes    Sleep apnea or symptoms of sleep apnea:  None    Diet:  Diabetic    Frequency of exercise:  None    Taking medications regularly:  Yes    Medication side effects:  Other    Additional concerns today:  No    Ability to successfully perform activities of daily living: no assistance needed    Home Safety:  No safety concerns identified    Hearing Impairment: difficulty following a conversation in a noisy restaurant or crowded room and difficulty understanding soft or whispered speech   Fall risk:  Fallen 2 or more times in the past year?: No  Any fall with injury in the past year?: No    COGNITIVE SCREEN  1) Repeat 3 items (Leader, Season, Table)    2) Clock draw: NORMAL  3) 3 item recall: Recalls 2 objects   Results: 3 items recalled: COGNITIVE IMPAIRMENT LESS LIKELY    Mini-CogTM Copyright S Alin. Licensed by the author for use in [facility]; reprinted with permission (sokota@.[facility]). All rights reserved.        Reviewed and updated as needed this visit by clinical staff         Reviewed and updated as needed this visit by Provider        Social History   Substance Use Topics     Smoking status: Never Smoker     Smokeless tobacco: Never Used     Alcohol use 0.0 oz/week     0 Standard drinks or equivalent per week      Comment: 1 drink a month or less, wine or deidra       Alcohol Use 10/11/2018   If you drink alcohol do you typically have greater than 3 drinks per day OR greater than 7 drinks per week?  Not Applicable   No flowsheet data found.         Today's PHQ-2 Score:   PHQ-2 ( 1999 [employer]) 10/11/2018   Q1: Little interest or pleasure in doing things 0   Q2: Feeling down, depressed or hopeless 1   PHQ-2 Score 1   Q1: Little interest or pleasure in doing things Not at all   Q2: Feeling down, depressed or hopeless Several days   PHQ-2 Score 1       Do you feel safe in your environment - Yes    Do you have a Health Care Directive?: Yes: Advance Directive has been received and scanned.    Current providers sharing in care for this patient include:   Patient Care Team:  Johana Pelayo MD as PCP - General    The following health maintenance items are reviewed in Epic and correct as of today:  Health Maintenance   Topic Date Due     COLONOSCOPY Q5 YR  02/21/2011     MICROALBUMIN Q1 YEAR  11/02/2017     MEDICARE ANNUAL WELLNESS VISIT  06/22/2018     FALL RISK ASSESSMENT  06/22/2018     INFLUENZA VACCINE (1) 09/01/2018     A1C Q6 MO  02/03/2019     EYE EXAM Q1 YEAR  02/05/2019     FOOT EXAM Q1 YEAR  02/06/2019     PHQ-2 Q1 YR  02/06/2019     TSH W/ FREE T4 REFLEX Q2 YEAR  06/22/2019     BMP Q1 YR  08/03/2019     LIPID MONITORING Q1 YEAR  08/03/2019     DEXA Q3 YR  11/16/2019     ADVANCE DIRECTIVE PLANNING Q5 YRS  10/07/2021     TETANUS IMMUNIZATION (SYSTEM ASSIGNED)  10/01/2022     PNEUMOCOCCAL  Completed     Labs reviewed in EPIC  BP Readings from Last 3 Encounters:   10/11/18 124/71   02/06/18 114/70   06/22/17 131/68    Wt Readings from Last 3 Encounters:   10/11/18 205 lb (93 kg)   02/06/18 216 lb (98 kg)   06/22/17 227 lb (103 kg)                  Patient Active Problem List   Diagnosis     Colon polyp     Pseudophakia OU     Sarcoidosis quiet     Advanced directives, counseling/discussion     Hyperlipidemia LDL goal <100     Endolymphatic hydrops     Edema of extremities     Localization-related focal epilepsy with simple partial seizures (H)     PVD (posterior vitreous detachment), bilateral     overweight   HCC     Essential hypertension with goal blood pressure less than 140/90     Choroidal nevus of left eye     Diabetic polyneuropathy associated with type 2 diabetes mellitus (H)     Nail dystrophy     Type 2 diabetes mellitus without complication, without long-term current use of insulin (H)     Overweight HCC     Past Surgical History:   Procedure Laterality Date     BIOPSY  1997    For sarcoidosis.      CATARACT IOL, RT/LT  2003, 2006    right, left - Dr. Cuevas     COLONOSCOPY       HC REMOVAL GALLBLADDER  2003     TONSILLECTOMY         Social History   Substance Use Topics     Smoking status: Never Smoker     Smokeless tobacco: Never Used     Alcohol use No     Family History   Problem Relation Age of Onset     Diabetes Mother      HEART DISEASE Father      Hypertension Son          Current Outpatient Prescriptions   Medication Sig Dispense Refill     aspirin 81 MG tablet Take 1 tablet by mouth daily.  3     blood glucose monitoring (ACCU-CHEK ANA PLUS) test strip Use to test blood sugar 1 times daily or as directed. 1 Box 3     blood glucose monitoring (ACCU-CHEK FASTCLIX) lancets Use to test blood sugar 1 times daily or as directed. 100 each 3     furosemide (LASIX) 40 MG tablet TAKE 1 TABLET(40 MG) BY MOUTH DAILY 90 tablet 1     lamoTRIgine (LAMICTAL) 100 MG tablet Take 1 tablet (100 mg) by mouth 2 times daily 180 tablet 3     losartan (COZAAR) 50 MG tablet TAKE 1 TABLET BY MOUTH TWICE DAILY 90 tablet 3     metFORMIN (GLUCOPHAGE) 500 MG tablet TAKE 1 TABLET BY MOUTH THREE TIMES DAILY WITH MEALS 90 tablet 3     Multiple Vitamins-Minerals (CENTRUM SILVER ULTRA WOMENS PO) Take  by mouth daily. 30 0     order for DME Equipment being ordered: 1 pair diabetic shoes. 1 each 0     ranitidine (ZANTAC) 150 MG tablet Take  by mouth. As needed       simvastatin (ZOCOR) 20 MG tablet TAKE 1 TABLET BY MOUTH AT BEDTIME 90 tablet 1     zoster vaccine recombinant adjuvanted (SHINGRIX) injection Inject 0.5 mLs into the muscle  once for 1 dose 0.5 mL 1     Allergies   Allergen Reactions     Latex      Penicillins      Recent Labs   Lab Test  08/03/18   0751  01/30/18   0910  06/22/17   0658  11/02/16   0731   04/12/16   0759   04/24/15   0802   A1C  6.3*  6.2*  6.7*  6.6*   < >  6.7*   < >  7.1*   LDL  92   --   83   --    --   79   --   73   HDL  69   --   62   --    --   64   --   58   TRIG  85   --   99   --    --   91   --   125   ALT  27   --   27   --    --    --    --   29   CR  0.87   --   0.84  0.83   < >   --    < >  0.90   GFRESTIMATED  63   --   66  67   < >   --    < >  61   GFRESTBLACK  76   --   79  81   < >   --    < >  74   POTASSIUM  4.4   --   4.4  4.5   < >   --    < >  4.3   TSH   --    --   1.09  1.36   --    --    --   0.96    < > = values in this interval not displayed.             Review of Systems   Constitutional: Negative for chills and fever.   HENT: Positive for hearing loss (hearing aids are great). Negative for congestion, ear pain and sore throat.    Eyes: Negative for pain and visual disturbance.   Respiratory: Negative for cough and shortness of breath.         No changes at all.     Cardiovascular: Negative for chest pain, palpitations and peripheral edema.   Gastrointestinal: Negative for abdominal pain, constipation, diarrhea, heartburn, hematochezia and nausea.   Breasts:  Negative for tenderness, breast mass and discharge.   Genitourinary: Negative for dysuria, frequency, genital sores, hematuria, pelvic pain, urgency, vaginal bleeding and vaginal discharge.   Musculoskeletal: Negative for arthralgias, joint swelling and myalgias.   Skin: Negative for rash.   Neurological: Negative for dizziness, weakness, headaches and paresthesias.   Psychiatric/Behavioral: Negative for mood changes. The patient is not nervous/anxious.      Constitutional, HEENT, cardiovascular, pulmonary, gi and gu systems are negative, except as otherwise noted.    OBJECTIVE:   /71 (BP Location: Left arm, Patient Position:  "Sitting, Cuff Size: Adult Large)  Pulse 87  Temp 96.3  F (35.7  C) (Oral)  Ht 5' 7\" (1.702 m)  Wt 205 lb (93 kg)  SpO2 96%  Breastfeeding? No  BMI 32.11 kg/m2 Estimated body mass index is 33.33 kg/(m^2) as calculated from the following:    Height as of 6/22/17: 5' 7.5\" (1.715 m).    Weight as of 2/6/18: 216 lb (98 kg).  Physical Exam  GENERAL APPEARANCE: healthy, alert and no distress  EYES: Eyes grossly normal to inspection, PERRL and conjunctivae and sclerae normal  HENT: ear canals and TM's normal, nose and mouth without ulcers or lesions, oropharynx clear and oral mucous membranes moist  NECK: no adenopathy, no asymmetry, masses, or scars and thyroid normal to palpation  RESP: lungs clear to auscultation - no rales, rhonchi or wheezes  BREAST: normal without masses, tenderness or nipple discharge and no palpable axillary masses or adenopathy  CV: regular rate and rhythm, normal S1 S2, no S3 or S4, no murmur, click or rub, no peripheral edema and peripheral pulses strong  ABDOMEN: soft, nontender, no hepatosplenomegaly, no masses and bowel sounds normal   (female): normal female external genitalia, normal urethral meatus, vaginal mucosal atrophy noted, normal cervix, adnexae, and uterus without masses or abnormal discharge   (female): bimanual negative.   MS: no musculoskeletal defects are noted and gait is age appropriate without ataxia  SKIN: no suspicious lesions or rashes  NEURO: Normal strength and tone, sensory exam grossly normal, mentation intact and speech normal  PSYCH: mentation appears normal and affect normal/bright    Diagnostic Test Results:  Results for orders placed or performed in visit on 08/03/18   Vitamin B12   Result Value Ref Range    Vitamin B12 697 193 - 986 pg/mL   Basic metabolic panel   Result Value Ref Range    Sodium 136 133 - 144 mmol/L    Potassium 4.4 3.4 - 5.3 mmol/L    Chloride 100 94 - 109 mmol/L    Carbon Dioxide 31 20 - 32 mmol/L    Anion Gap 5 3 - 14 mmol/L    " Glucose 142 (H) 70 - 99 mg/dL    Urea Nitrogen 14 7 - 30 mg/dL    Creatinine 0.87 0.52 - 1.04 mg/dL    GFR Estimate 63 >60 mL/min/1.7m2    GFR Estimate If Black 76 >60 mL/min/1.7m2    Calcium 9.8 8.5 - 10.1 mg/dL   Lipid panel reflex to direct LDL Fasting   Result Value Ref Range    Cholesterol 178 <200 mg/dL    Triglycerides 85 <150 mg/dL    HDL Cholesterol 69 >49 mg/dL    LDL Cholesterol Calculated 92 <100 mg/dL    Non HDL Cholesterol 109 <130 mg/dL   Hemoglobin A1c   Result Value Ref Range    Hemoglobin A1C 6.3 (H) 0 - 5.6 %   AST   Result Value Ref Range    AST 18 0 - 45 U/L   ALT   Result Value Ref Range    ALT 27 0 - 50 U/L       ASSESSMENT / PLAN:       ICD-10-CM    1. Routine general medical examination at a health care facility Z00.00    2. Type 2 diabetes mellitus without complication, without long-term current use of insulin (H) E11.9 Albumin Random Urine Quantitative with Creat Ratio     Hemoglobin A1c     metFORMIN (GLUCOPHAGE) 500 MG tablet     Basic metabolic panel     OFFICE/OUTPT VISIT,EST,LEVL III   3. Localization-related focal epilepsy with simple partial seizures (H) G40.109    4. Sarcoidosis quiet D86.9 XR Chest 2 Views     OFFICE/OUTPT VISIT,EST,LEVL III   5. Overweight HCC E66.01     Z68.35    6. Screen for colon cancer Z12.11 GASTROENTEROLOGY ADULT REF PROCEDURE ONLY Other   7. Edema of extremities R60.0 furosemide (LASIX) 40 MG tablet     Basic metabolic panel   8. Weight loss R63.4 OFFICE/OUTPT VISIT,EST,LEVL III   9. Need for shingles vaccine Z23 zoster vaccine recombinant adjuvanted (SHINGRIX) injection   10. Need for prophylactic vaccination and inoculation against influenza Z23 FLU VACCINE, INCREASED ANTIGEN, PRESV FREE, AGE 65+ [64710]     ADMIN INFLUENZA (For MEDICARE Patients ONLY) []       Patient Instructions     Reduce metformin to 500 mg at supper .   Reduce Lasix (furosemide) to 20 mg daily (1/2 of your 40 mg tab)  Stop Potassium    Check labs in mid November.   "    Colonoscopy will call you.  .    Schedule a shingrix (shingles vaccine) - make an \"Ancillary appointment \"... You would pay for it on the way in at our pharmacy.      End of Life Planning:  Patient currently has an advanced directive: Yes.  Practitioner is supportive of decision.    COUNSELING:  Reviewed preventive health counseling, as reflected in patient instructions       Immunizations    Vaccinated for: Influenza          BP Readings from Last 1 Encounters:   02/06/18 114/70     Estimated body mass index is 33.33 kg/(m^2) as calculated from the following:    Height as of 6/22/17: 5' 7.5\" (1.715 m).    Weight as of 2/6/18: 216 lb (98 kg).           reports that she has never smoked. She has never used smokeless tobacco.      Appropriate preventive services were discussed with this patient, including applicable screening as appropriate for cardiovascular disease, diabetes, osteopenia/osteoporosis, and glaucoma.  As appropriate for age/gender, discussed screening for colorectal cancer, prostate cancer, breast cancer, and cervical cancer. Checklist reviewing preventive services available has been given to the patient.    Reviewed patients plan of care and provided an AVS. The Basic Care Plan (routine screening as documented in Health Maintenance) for Phyllis meets the Care Plan requirement. This Care Plan has been established and reviewed with the Patient.    Counseling Resources:  ATP IV Guidelines  Pooled Cohorts Equation Calculator  Breast Cancer Risk Calculator  FRAX Risk Assessment  ICSI Preventive Guidelines  Dietary Guidelines for Americans, 2010  USDA's MyPlate  ASA Prophylaxis  Lung CA Screening    Johana Pelayo MD  [facility]  Answers for HPI/ROS submitted by the patient on 10/11/2018   PHQ-2 Score: 1    "

## 2018-10-11 NOTE — PATIENT INSTRUCTIONS
Preventive Health Recommendations    Female Ages 65 +    Yearly exam:     See your health care provider every year in order to  o Review health changes.   o Discuss preventive care.    o Review your medicines if your doctor has prescribed any.      You no longer need a yearly Pap test unless you've had an abnormal Pap test in the past 10 years. If you have vaginal symptoms, such as bleeding or discharge, be sure to talk with your provider about a Pap test.      Every 1 to 2 years, have a mammogram.  If you are over 69, talk with your health care provider about whether or not you want to continue having screening mammograms.      Every 10 years, have a colonoscopy. Or, have a yearly FIT test (stool test). These exams will check for colon cancer.       Have a cholesterol test every 5 years, or more often if your doctor advises it.       Have a diabetes test (fasting glucose) every three years. If you are at risk for diabetes, you should have this test more often.       At age 65, have a bone density scan (DEXA) to check for osteoporosis (brittle bone disease).    Shots:    Get a flu shot each year.    Get a tetanus shot every 10 years.    Talk to your doctor about your pneumonia vaccines. There are now two you should receive - Pneumovax (PPSV 23) and Prevnar (PCV 13).    Talk to your pharmacist about the shingles vaccine.    Talk to your doctor about the hepatitis B vaccine.    Nutrition:     Eat at least 5 servings of fruits and vegetables each day.      Eat whole-grain bread, whole-wheat pasta and brown rice instead of white grains and rice.      Get adequate Calcium and Vitamin D.     Lifestyle    Exercise at least 150 minutes a week (30 minutes a day, 5 days a week). This will help you control your weight and prevent disease.      Limit alcohol to one drink per day.      No smoking.       Wear sunscreen to prevent skin cancer.       See your dentist twice a year for an exam and cleaning.      See your eye doctor  "every 1 to 2 years to screen for conditions such as glaucoma, macular degeneration and cataracts.      Reduce metformin to 500 mg (1 tab) at supper .     Reduce Lasix (furosemide) to 20 mg daily (1/2 of your 40 mg tab)  Stop Potassium    Check labs in mid November.      Colonoscopy will call you.  .    Schedule a shingrix (shingles vaccine) - make an \"Ancillary appointment \"... You would pay for it on the way in at our pharmacy.       "

## 2018-10-12 NOTE — PROGRESS NOTES
Jt Ferguson,     No change in lung infiltrates.  Keep this report in case you are in an ER somewhere out of town.     Sincerely,     ASHLY FOURNIER M.D.

## 2018-10-15 ENCOUNTER — TELEPHONE (OUTPATIENT)
Dept: FAMILY MEDICINE | Facility: CLINIC | Age: 78
End: 2018-10-15

## 2018-10-15 NOTE — TELEPHONE ENCOUNTER
Called pharmacy.  They stated that patient is not due for a refill until 10/31/18.  Patient is out of medication today.  Pharmacy stated that they will call her insurance to see why they will not cover the refills yet.  Advised patient to call the pharmacy and they will let her know if it went through her insurance.  Advised patient to call us back with any further concerns.  Julienne Miguel RN [facility] Triage.

## 2018-10-15 NOTE — TELEPHONE ENCOUNTER
Reason for Call:  Other prescription    Detailed comments:  Patient said she went to the pharmacy to  her prescription for losartan (COZAAR)  tablet and she was told that it wasn't there please send to [facility] [id number] - [place], [place] - [address] [facility] [address]    Phone Number Patient can be reached at: Home number on file [phone] (home)    Best Time: any    Can we leave a detailed message on this number? YES    Call taken on 10/15/2018 at 7:19 AM by Hazel Mckay

## 2018-11-20 DIAGNOSIS — E11.9 TYPE 2 DIABETES MELLITUS WITHOUT COMPLICATION, WITHOUT LONG-TERM CURRENT USE OF INSULIN (H): ICD-10-CM

## 2018-11-20 DIAGNOSIS — R60.0 EDEMA OF EXTREMITIES: ICD-10-CM

## 2018-11-20 LAB
ANION GAP SERPL CALCULATED.3IONS-SCNC: 6 MMOL/L (ref 3–14)
BUN SERPL-MCNC: 13 MG/DL (ref 7–30)
CALCIUM SERPL-MCNC: 9.4 MG/DL (ref 8.5–10.1)
CHLORIDE SERPL-SCNC: 103 MMOL/L (ref 94–109)
CO2 SERPL-SCNC: 29 MMOL/L (ref 20–32)
CREAT SERPL-MCNC: 0.85 MG/DL (ref 0.52–1.04)
CREAT UR-MCNC: 32 MG/DL
GFR SERPL CREATININE-BSD FRML MDRD: 65 ML/MIN/1.7M2
GLUCOSE SERPL-MCNC: 127 MG/DL (ref 70–99)
HBA1C MFR BLD: 6.2 % (ref 0–5.6)
MICROALBUMIN UR-MCNC: <5 MG/L
MICROALBUMIN/CREAT UR: NORMAL MG/G CR (ref 0–25)
POTASSIUM SERPL-SCNC: 4.6 MMOL/L (ref 3.4–5.3)
SODIUM SERPL-SCNC: 138 MMOL/L (ref 133–144)

## 2018-11-20 PROCEDURE — 82043 UR ALBUMIN QUANTITATIVE: CPT | Performed by: INTERNAL MEDICINE

## 2018-11-20 PROCEDURE — 83036 HEMOGLOBIN GLYCOSYLATED A1C: CPT | Performed by: INTERNAL MEDICINE

## 2018-11-20 PROCEDURE — 80048 BASIC METABOLIC PNL TOTAL CA: CPT | Performed by: INTERNAL MEDICINE

## 2018-11-20 PROCEDURE — 36415 COLL VENOUS BLD VENIPUNCTURE: CPT | Performed by: INTERNAL MEDICINE

## 2018-11-20 NOTE — LETTER
[facility]  [address] [place]  [address]  [phone]        November 23, 2018    Phyllis Aleman  [address]  [facility] [address]        Dear Phyllis,    Enclosed are your results.  Your labs are normal/stable at this time. Excellent blood sugar control     Please call  with any questions.  We can also discuss any questions regarding these labs at your next scheduled visit.    Results for orders placed or performed in visit on 11/20/18   Albumin Random Urine Quantitative with Creat Ratio   Result Value Ref Range    Creatinine Urine 32 mg/dL    Albumin Urine mg/L <5 mg/L    Albumin Urine mg/g Cr Unable to calculate due to low value 0 - 25 mg/g Cr   Hemoglobin A1c   Result Value Ref Range    Hemoglobin A1C 6.2 (H) 0 - 5.6 %   Basic metabolic panel   Result Value Ref Range    Sodium 138 133 - 144 mmol/L    Potassium 4.6 3.4 - 5.3 mmol/L    Chloride 103 94 - 109 mmol/L    Carbon Dioxide 29 20 - 32 mmol/L    Anion Gap 6 3 - 14 mmol/L    Glucose 127 (H) 70 - 99 mg/dL    Urea Nitrogen 13 7 - 30 mg/dL    Creatinine 0.85 0.52 - 1.04 mg/dL    GFR Estimate 65 >60 mL/min/1.7m2    GFR Estimate If Black 78 >60 mL/min/1.7m2    Calcium 9.4 8.5 - 10.1 mg/dL   If you have any questions please call the clinic at [phone].  Sincerely,    Johana CORLEY

## 2018-11-23 NOTE — PROGRESS NOTES
Phyllis Aleman    Enclosed are your results.  Your labs are normal/stable at this time. Excellent blood sugar control     Please call  with any questions.  We can also discuss any questions regarding these labs at your next scheduled visit.    Sincerely,    Johana Pelayo M.D.

## 2018-12-05 ENCOUNTER — TRANSFERRED RECORDS (OUTPATIENT)
Dept: HEALTH INFORMATION MANAGEMENT | Facility: CLINIC | Age: 78
End: 2018-12-05

## 2018-12-07 ENCOUNTER — TELEPHONE (OUTPATIENT)
Dept: FAMILY MEDICINE | Facility: CLINIC | Age: 78
End: 2018-12-07

## 2018-12-07 DIAGNOSIS — I10 ESSENTIAL HYPERTENSION WITH GOAL BLOOD PRESSURE LESS THAN 140/90: Chronic | ICD-10-CM

## 2018-12-07 DIAGNOSIS — E11.9 TYPE 2 DIABETES MELLITUS WITHOUT COMPLICATION, WITHOUT LONG-TERM CURRENT USE OF INSULIN (H): ICD-10-CM

## 2018-12-07 RX ORDER — LOSARTAN POTASSIUM 50 MG/1
TABLET ORAL
Qty: 60 TABLET | Refills: 0 | OUTPATIENT
Start: 2018-12-07

## 2018-12-07 NOTE — TELEPHONE ENCOUNTER
"Requested Prescriptions   Pending Prescriptions Disp Refills     losartan (COZAAR) 50 MG tablet [Pharmacy Med Name: LOSARTAN 50MG TABLETS] 60 tablet 0    Last Written Prescription Date:  10/5/18  Last Fill Quantity: 90,  # refills: 3   Last office visit: 10/11/2018 with prescribing provider:     Future Office Visit:   Next 5 appointments (look out 90 days)     Jan 07, 2019 10:00 AM CST   [facility] with [facility]    [address] [facility] [id number] [phone]                  Sig: TAKE 1 TABLET BY MOUTH TWICE DAILY    Angiotensin-II Receptors Passed    12/7/2018  2:26 PM       Passed - Blood pressure under 140/90 in past 12 months    BP Readings from Last 3 Encounters:   10/11/18 124/71   02/06/18 114/70   06/22/17 131/68                Passed - Recent (12 mo) or future (30 days) visit within the authorizing provider's specialty    Patient had office visit in the last 12 months or has a visit in the next 30 days with authorizing provider or within the authorizing provider's specialty.  See \"Patient Info\" tab in inbasket, or \"Choose Columns\" in Meds & Orders section of the refill encounter.             Passed - Patient is age 18 or older       Passed - No active pregnancy on record       Passed - Normal serum creatinine on file in past 12 months    Recent Labs   Lab Test  11/20/18   1020   CR  0.85            Passed - Normal serum potassium on file in past 12 months    Recent Labs   Lab Test  11/20/18   1020   POTASSIUM  4.6                   Passed - No positive pregnancy test in past 12 months          "

## 2018-12-12 RX ORDER — LOSARTAN POTASSIUM 50 MG/1
50 TABLET ORAL 2 TIMES DAILY
Qty: 90 TABLET | Refills: 3 | Status: SHIPPED | OUTPATIENT
Start: 2018-12-12 | End: 2019-05-25

## 2018-12-12 NOTE — PATIENT INSTRUCTIONS
Patient calling stating that the pharmacy does not have any refills on file. TC contacted [facility] and they did not receive the script that was sent on 10/05/18. Patient is almost out of medication. Routing as patient calls to address.

## 2019-01-03 ENCOUNTER — TELEPHONE (OUTPATIENT)
Dept: FAMILY MEDICINE | Facility: CLINIC | Age: 79
End: 2019-01-03

## 2019-01-03 NOTE — TELEPHONE ENCOUNTER
I see scanned colonoscopy results in Epic from 12/5/18.   Signed by [facility] but I don't see any letter mailed or call to patient.    Routed to [facility] to address colonoscopy results to be relayed to patient.    Brigida Perez RN  [facility]

## 2019-01-03 NOTE — TELEPHONE ENCOUNTER
Reason for Call:  Request for results:    Name of test or procedure: colonoscopy    Date of test of procedure: 12/5/18     Location of the test or procedure: [facility]    OK to leave the result message on voice mail or with a family member? YES    Phone number Patient can be reached at:  Home number on file [phone] (home)    Additional comments: Please call patient with results as she hasn't heard anything.    Call taken on 1/3/2019 at 10:13 AM by Melissa Urias

## 2019-01-07 NOTE — TELEPHONE ENCOUNTER
Hi there.     [facility] should have sent letter to her.  If not, then it is okay to print the report which was scanned and send to her for her records.

## 2019-01-07 NOTE — TELEPHONE ENCOUNTER
Patient checking to see if we received her Colonoscopy results.  She was given the results by [facility].  Walked through results with patient.  She is currently feeling well and has not symptoms.  She will call with any further questions.  Lilliam Gonzalez RN

## 2019-01-07 NOTE — TELEPHONE ENCOUNTER
Tried calling patient 3 times, but phone rings then fast busy.  Will try back.  Lilliam Gonzalez RN

## 2019-02-13 ENCOUNTER — OFFICE VISIT (OUTPATIENT)
Dept: OPHTHALMOLOGY | Facility: CLINIC | Age: 79
End: 2019-02-13
Payer: MEDICARE

## 2019-02-13 ENCOUNTER — TRANSFERRED RECORDS (OUTPATIENT)
Dept: HEALTH INFORMATION MANAGEMENT | Facility: CLINIC | Age: 79
End: 2019-02-13

## 2019-02-13 DIAGNOSIS — Z96.1 PSEUDOPHAKIA: ICD-10-CM

## 2019-02-13 DIAGNOSIS — H52.4 PRESBYOPIA: ICD-10-CM

## 2019-02-13 DIAGNOSIS — D31.32 CHOROIDAL NEVUS OF LEFT EYE: ICD-10-CM

## 2019-02-13 DIAGNOSIS — E11.9 TYPE 2 DIABETES MELLITUS WITHOUT COMPLICATION, WITHOUT LONG-TERM CURRENT USE OF INSULIN (H): Primary | ICD-10-CM

## 2019-02-13 DIAGNOSIS — H43.813 PVD (POSTERIOR VITREOUS DETACHMENT), BILATERAL: ICD-10-CM

## 2019-02-13 PROCEDURE — 92015 DETERMINE REFRACTIVE STATE: CPT | Mod: GY | Performed by: STUDENT IN AN ORGANIZED HEALTH CARE EDUCATION/TRAINING PROGRAM

## 2019-02-13 PROCEDURE — 92014 COMPRE OPH EXAM EST PT 1/>: CPT | Performed by: STUDENT IN AN ORGANIZED HEALTH CARE EDUCATION/TRAINING PROGRAM

## 2019-02-13 ASSESSMENT — SLIT LAMP EXAM - LIDS
COMMENTS: 1+ DERMATOCHALASIS
COMMENTS: 1+ DERMATOCHALASIS

## 2019-02-13 ASSESSMENT — REFRACTION_WEARINGRX
OS_AXIS: 003
OD_SPHERE: -0.50
OD_ADD: +3.00
OD_AXIS: 176
OS_CYLINDER: +1.25
SPECS_TYPE: PAL
OD_CYLINDER: +0.50
OS_SPHERE: -0.75
OS_ADD: +3.00

## 2019-02-13 ASSESSMENT — REFRACTION_MANIFEST
OD_AXIS: 015
OS_CYLINDER: +1.50
OS_SPHERE: -0.75
OD_SPHERE: +0.50
OD_CYLINDER: +1.25
OS_AXIS: 165
OS_ADD: +2.75
OD_ADD: +2.75

## 2019-02-13 ASSESSMENT — VISUAL ACUITY
OS_PH_CC: 20/25
OD_PH_CC: 20/25
OD_PH_CC+: -2
METHOD: SNELLEN - LINEAR
OS_CC: 20/30
OS_CC: 2
OS_PH_CC+: -1
OD_CC: 8
OD_CC: 20/40
OD_CC+: -1
CORRECTION_TYPE: GLASSES

## 2019-02-13 ASSESSMENT — TONOMETRY
OS_IOP_MMHG: 18
IOP_METHOD: APPLANATION
OD_IOP_MMHG: 19

## 2019-02-13 ASSESSMENT — CONF VISUAL FIELD
OD_NORMAL: 1
OS_NORMAL: 1

## 2019-02-13 ASSESSMENT — CUP TO DISC RATIO
OS_RATIO: 0.3
OD_RATIO: 0.35

## 2019-02-13 ASSESSMENT — EXTERNAL EXAM - RIGHT EYE: OD_EXAM: NORMAL

## 2019-02-13 ASSESSMENT — EXTERNAL EXAM - LEFT EYE: OS_EXAM: NORMAL

## 2019-02-13 NOTE — LETTER
2/13/2019      RE: Phyllis Aleman  [address]  [address]  [facility] [address]      Dear Dr. Pelayo,    Thank you for referring your patient, Phyllis Aleman, to the [facility].     No signs of diabetic retinopathy in either eye today.  Please see a copy of my visit note below.     Current Eye Medications:  No eye drops.  Centrum silver.       Subjective:  Patient is here for a Diabetic Eye Exam.  Patient has noticed herself using her trifocal when attempting to see something in the distance.  Reading vision is good with correction.  Occasionally, first thing in the morning, her eyes are bothered by bright lights (in her kitchen), but not every day.    Lab Results   Component Value Date    A1C 6.2 11/20/2018    A1C 6.3 08/03/2018    A1C 6.2 01/30/2018    A1C 6.7 06/22/2017    A1C 6.6 11/02/2016        Objective:  See Ophthalmology Exam.       Assessment:  Phyllis Aleman is a 78 year old female who presents with:   Encounter Diagnoses   Name Primary?     Type 2 diabetes mellitus without complication, without long-term current use of insulin (H) Negative diabetic retinopathy      Pseudophakia OU      PVD (posterior vitreous detachment), bilateral      Choroidal nevus of left eye Stable, no worrisome features.       Plan:  Glasses prescription given   Keep blood sugars and blood pressure under good control.    Shirley Edwards MD  [phone]      Again, thank you for allowing me to participate in the care of your patient.        Sincerely,        Shirley Edwards MD

## 2019-02-13 NOTE — PROGRESS NOTES
Current Eye Medications:  No eye drops.  Centrum silver.       Subjective:  Patient is here for a Diabetic Eye Exam.  Patient has noticed herself using her trifocal when attempting to see something in the distance.  Reading vision is good with correction.  Occasionally, first thing in the morning, her eyes are bothered by bright lights (in her kitchen), but not every day.    Lab Results   Component Value Date    A1C 6.2 11/20/2018    A1C 6.3 08/03/2018    A1C 6.2 01/30/2018    A1C 6.7 06/22/2017    A1C 6.6 11/02/2016        Objective:  See Ophthalmology Exam.       Assessment:  Phyllis Aleman is a 78 year old female who presents with:   Encounter Diagnoses   Name Primary?     Type 2 diabetes mellitus without complication, without long-term current use of insulin (H) Negative diabetic retinopathy      Pseudophakia OU      PVD (posterior vitreous detachment), bilateral      Choroidal nevus of left eye Stable, no worrisome features.       Plan:  Glasses prescription given   Keep blood sugars and blood pressure under good control.    Shirley Edwards MD  [phone]

## 2019-02-13 NOTE — PATIENT INSTRUCTIONS
Glasses prescription given     Keep blood sugars and blood pressure under good control.    Shirley Edwards MD  [phone]    Patient Education   Diabetes weakens the blood vessels all over the body, including the eyes. Damage to the blood vessels in the eyes can cause swelling or bleeding into part of the eye (called the retina). This is called diabetic retinopathy (CHARLA-tin-AH-puh-thee). If not treated, this disease can cause vision loss or blindness.   Symptoms may include blurred or distorted vision, but many people have no symptoms. It's important to see your eye doctor regularly to check for problems.   Early treatment and good control can help protect your vision. Here are the things you can do to help prevent vision loss:      1. Keep your blood sugar levels under tight control.      2. Bring high blood pressure under control.      3. No smoking.      4. Have yearly dilated eye exams.

## 2019-02-23 DIAGNOSIS — E78.5 HYPERLIPIDEMIA LDL GOAL <100: ICD-10-CM

## 2019-02-25 RX ORDER — SIMVASTATIN 20 MG
TABLET ORAL
Qty: 90 TABLET | Refills: 0 | Status: SHIPPED | OUTPATIENT
Start: 2019-02-25 | End: 2019-05-24

## 2019-02-25 NOTE — TELEPHONE ENCOUNTER
Prescription approved per [facility] Refill Protocol.  Julienne Miguel, RN [facility] Triage.

## 2019-02-25 NOTE — TELEPHONE ENCOUNTER
"Requested Prescriptions   Pending Prescriptions Disp Refills     simvastatin (ZOCOR) 20 MG tablet [Pharmacy Med Name: SIMVASTATIN 20MG TABLETS] 90 tablet 0    Last Written Prescription Date:  8/30/18  Last Fill Quantity: 90,  # refills: 1   Last office visit: 10/11/2018 with prescribing provider:     Future Office Visit:     Sig: TAKE 1 TABLET BY MOUTH AT BEDTIME    Statins Protocol Passed - 2/23/2019  5:02 AM       Passed - LDL on file in past 12 months    Recent Labs   Lab Test 08/03/18  0751   LDL 92            Passed - No abnormal creatine kinase in past 12 months    Recent Labs   Lab Test 06/22/17  0658   CKT 60               Passed - Recent (12 mo) or future (30 days) visit within the authorizing provider's specialty    Patient had office visit in the last 12 months or has a visit in the next 30 days with authorizing provider or within the authorizing provider's specialty.  See \"Patient Info\" tab in inbasket, or \"Choose Columns\" in Meds & Orders section of the refill encounter.             Passed - Medication is active on med list       Passed - Patient is age 18 or older       Passed - No active pregnancy on record       Passed - No positive pregnancy test in past 12 months          "

## 2019-04-16 ENCOUNTER — DOCUMENTATION ONLY (OUTPATIENT)
Dept: OPHTHALMOLOGY | Facility: CLINIC | Age: 79
End: 2019-04-16

## 2019-05-24 DIAGNOSIS — E78.5 HYPERLIPIDEMIA LDL GOAL <100: ICD-10-CM

## 2019-05-24 RX ORDER — SIMVASTATIN 20 MG
TABLET ORAL
Qty: 90 TABLET | Refills: 0 | Status: SHIPPED | OUTPATIENT
Start: 2019-05-24 | End: 2019-08-22

## 2019-05-24 NOTE — TELEPHONE ENCOUNTER
Prescription approved per [facility] Refill Protocol.  Lilliam Gonzalez RN

## 2019-05-24 NOTE — TELEPHONE ENCOUNTER
"Requested Prescriptions   Pending Prescriptions Disp Refills     simvastatin (ZOCOR) 20 MG tablet [Pharmacy Med Name: SIMVASTATIN 20MG TABLETS] 90 tablet 0     Sig: TAKE 1 TABLET BY MOUTH AT BEDTIME   Last Written Prescription Date:  2-25-19  Last Fill Quantity: 90,  # refills: 0   Last office visit: 10/11/2018 with prescribing provider:     Future Office Visit:        Statins Protocol Passed - 5/24/2019  5:03 AM        Passed - LDL on file in past 12 months     Recent Labs   Lab Test 08/03/18  0751   LDL 92             Passed - No abnormal creatine kinase in past 12 months     Recent Labs   Lab Test 06/22/17  0658   CKT 60                Passed - Recent (12 mo) or future (30 days) visit within the authorizing provider's specialty     Patient had office visit in the last 12 months or has a visit in the next 30 days with authorizing provider or within the authorizing provider's specialty.  See \"Patient Info\" tab in inbasket, or \"Choose Columns\" in Meds & Orders section of the refill encounter.              Passed - Medication is active on med list        Passed - Patient is age 18 or older        Passed - No active pregnancy on record        Passed - No positive pregnancy test in past 12 months          "

## 2019-06-12 ENCOUNTER — DOCUMENTATION ONLY (OUTPATIENT)
Dept: LAB | Facility: CLINIC | Age: 79
End: 2019-06-12

## 2019-06-12 DIAGNOSIS — E11.9 TYPE 2 DIABETES MELLITUS WITHOUT COMPLICATION, WITHOUT LONG-TERM CURRENT USE OF INSULIN (H): ICD-10-CM

## 2019-06-12 DIAGNOSIS — E78.5 HYPERLIPIDEMIA LDL GOAL <100: Primary | ICD-10-CM

## 2019-06-18 DIAGNOSIS — E11.9 TYPE 2 DIABETES MELLITUS WITHOUT COMPLICATION, WITHOUT LONG-TERM CURRENT USE OF INSULIN (H): ICD-10-CM

## 2019-06-18 DIAGNOSIS — G40.802 VISUAL EPILEPSY (H): Primary | ICD-10-CM

## 2019-06-18 DIAGNOSIS — E78.5 HYPERLIPIDEMIA LDL GOAL <100: ICD-10-CM

## 2019-06-18 DIAGNOSIS — E55.9 VITAMIN D DEFICIENCY DISEASE: ICD-10-CM

## 2019-06-18 LAB
CHOLEST SERPL-MCNC: 173 MG/DL
HBA1C MFR BLD: 6.6 % (ref 0–5.6)
HDLC SERPL-MCNC: 67 MG/DL
LDLC SERPL CALC-MCNC: 87 MG/DL
NONHDLC SERPL-MCNC: 106 MG/DL
TRIGL SERPL-MCNC: 93 MG/DL
TSH SERPL DL<=0.005 MIU/L-ACNC: 0.77 MU/L (ref 0.4–4)

## 2019-06-18 PROCEDURE — 99000 SPECIMEN HANDLING OFFICE-LAB: CPT | Performed by: INTERNAL MEDICINE

## 2019-06-18 PROCEDURE — 80061 LIPID PANEL: CPT | Performed by: INTERNAL MEDICINE

## 2019-06-18 PROCEDURE — 83036 HEMOGLOBIN GLYCOSYLATED A1C: CPT | Performed by: INTERNAL MEDICINE

## 2019-06-18 PROCEDURE — 84443 ASSAY THYROID STIM HORMONE: CPT | Performed by: INTERNAL MEDICINE

## 2019-06-18 PROCEDURE — 82306 VITAMIN D 25 HYDROXY: CPT

## 2019-06-18 PROCEDURE — 36415 COLL VENOUS BLD VENIPUNCTURE: CPT | Performed by: INTERNAL MEDICINE

## 2019-06-18 PROCEDURE — 80175 DRUG SCREEN QUAN LAMOTRIGINE: CPT | Mod: 90 | Performed by: INTERNAL MEDICINE

## 2019-06-18 NOTE — LETTER
[facility]  [address] [place]  [address]  [phone]        June 20, 2019    Phyllis Aleman  [address]  [facility] [address]        Dear Phyllis,    Enclosed are your results.  Your labs are normal/stable at this time.     Please call  with any questions.  We can also discuss any questions regarding these labs at your next scheduled visit.    Results for orders placed or performed in visit on 06/18/19   Hemoglobin A1c   Result Value Ref Range    Hemoglobin A1C 6.6 (H) 0 - 5.6 %   TSH with free T4 reflex   Result Value Ref Range    TSH 0.77 0.40 - 4.00 mU/L   Lipid panel reflex to direct LDL Fasting   Result Value Ref Range    Cholesterol 173 <200 mg/dL    Triglycerides 93 <150 mg/dL    HDL Cholesterol 67 >49 mg/dL    LDL Cholesterol Calculated 87 <100 mg/dL    Non HDL Cholesterol 106 <130 mg/dL   Lamotrigine Level   Result Value Ref Range    Lamotrigine Level 3.2 2.5 - 15.0 ug/mL   Vitamin D Deficiency   Result Value Ref Range    Vitamin D Deficiency screening 41 20 - 75 ug/L   If you have any questions please call the clinic at [phone].    Sincerely,    Johana CORLEY

## 2019-06-19 LAB
DEPRECATED CALCIDIOL+CALCIFEROL SERPL-MC: 41 UG/L (ref 20–75)
LAMOTRIGINE SERPL-MCNC: 3.2 UG/ML (ref 2.5–15)

## 2019-07-30 ENCOUNTER — OFFICE VISIT (OUTPATIENT)
Dept: FAMILY MEDICINE | Facility: CLINIC | Age: 79
End: 2019-07-30
Payer: MEDICARE

## 2019-07-30 VITALS
HEART RATE: 85 BPM | DIASTOLIC BLOOD PRESSURE: 73 MMHG | SYSTOLIC BLOOD PRESSURE: 132 MMHG | TEMPERATURE: 98.5 F | BODY MASS INDEX: 35.71 KG/M2 | WEIGHT: 228 LBS | OXYGEN SATURATION: 95 %

## 2019-07-30 DIAGNOSIS — E11.9 TYPE 2 DIABETES MELLITUS WITHOUT COMPLICATION, WITHOUT LONG-TERM CURRENT USE OF INSULIN (H): ICD-10-CM

## 2019-07-30 DIAGNOSIS — E11.42 DIABETIC POLYNEUROPATHY ASSOCIATED WITH TYPE 2 DIABETES MELLITUS (H): Primary | ICD-10-CM

## 2019-07-30 DIAGNOSIS — I10 ESSENTIAL HYPERTENSION WITH GOAL BLOOD PRESSURE LESS THAN 140/90: Chronic | ICD-10-CM

## 2019-07-30 DIAGNOSIS — M54.41 CHRONIC RIGHT-SIDED LOW BACK PAIN WITH RIGHT-SIDED SCIATICA: ICD-10-CM

## 2019-07-30 DIAGNOSIS — G89.29 CHRONIC RIGHT-SIDED LOW BACK PAIN WITH RIGHT-SIDED SCIATICA: ICD-10-CM

## 2019-07-30 DIAGNOSIS — B07.9 WART OF SCALP: ICD-10-CM

## 2019-07-30 DIAGNOSIS — D86.9 SARCOIDOSIS: ICD-10-CM

## 2019-07-30 PROCEDURE — 17110 DESTRUCTION B9 LES UP TO 14: CPT | Performed by: INTERNAL MEDICINE

## 2019-07-30 PROCEDURE — 99214 OFFICE O/P EST MOD 30 MIN: CPT | Mod: 25 | Performed by: INTERNAL MEDICINE

## 2019-07-30 RX ORDER — PREDNISONE 10 MG/1
TABLET ORAL
Qty: 21 TABLET | Refills: 0 | Status: SHIPPED | OUTPATIENT
Start: 2019-07-30 | End: 2019-12-01

## 2019-07-30 ASSESSMENT — PAIN SCALES - GENERAL: PAINLEVEL: SEVERE PAIN (6)

## 2019-07-30 NOTE — PATIENT INSTRUCTIONS
Prednisone 10 mg tabs  Four tabs daily X 3 d  Two tabs daily X 3 d  One tab daily X 3 d    Then stop        Physical therapy will call you    Call if symptoms don't improve... 3-4 weeks      Return to clinic November for physical with fasting labs prior.

## 2019-07-30 NOTE — PROGRESS NOTES
"Subjective     Phyllis Aleman is a 78 year old female who presents to clinic today for the following health issues:  79 y/o WF with uncomplicated DM II, sz disorder (Lamictal), quiet sarcoidosis here for Leg problem.  --- Right sided sciatica similar to 10 years ago when she had L sciatica..     HPI   Musculoskeletal problem/pain      Duration: 2 months    Description  Location: right  Leg     Sharp pain in the mornings... Dull in evenings.  Off and on during day when she is more conscious of shifting positions    Intensity:  Severe getting worse    Accompanying signs and symptoms: swelling in feet and ankle but pt is unsure if due to feet hanging down in recliner chair    History  Previous similar problem: YES- on left leg with sciatic nerve  Previous evaluation:  none    Precipitating or alleviating factors:  Trauma or overuse: no   Aggravating factors include: laying down on right side and sometimes walking, but not all the time.  Laying on back makes it worse and she has to go to sleep in recliner.  Pain will sometime radiate to below her right knee.      Therapies tried and outcome: massaging, exercising   Tried ibuprofen, and it did not help much.       No fevers, not falls.  She recalls similar symptoms on left side and a medication she took \"for a short time\" helped.       Pt would also like for wart to be removed in left side of forehead.           Patient Active Problem List   Diagnosis     Colon polyp     Pseudophakia OU     Sarcoidosis quiet     Advanced directives, counseling/discussion     Hyperlipidemia LDL goal <100     Endolymphatic hydrops     Edema of extremities     Localization-related focal epilepsy with simple partial seizures (H)     PVD (posterior vitreous detachment), bilateral     overweight  HCC     Essential hypertension with goal blood pressure less than 140/90     Choroidal nevus of left eye     Diabetic polyneuropathy associated with type 2 diabetes mellitus (H)     Nail " dystrophy     Type 2 diabetes mellitus without complication, without long-term current use of insulin (H)     Overweight HCC     Past Surgical History:   Procedure Laterality Date     BIOPSY  1997    For sarcoidosis.      CATARACT IOL, RT/LT  2003, 2006    right, left - Dr. Cuevas     COLONOSCOPY       HC REMOVAL GALLBLADDER  2003     TONSILLECTOMY         Social History     Tobacco Use     Smoking status: Never Smoker     Smokeless tobacco: Never Used   Substance Use Topics     Alcohol use: No     Alcohol/week: 0.0 oz     Family History   Problem Relation Age of Onset     Diabetes Mother      Heart Disease Father      Hypertension Son          Current Outpatient Medications   Medication Sig Dispense Refill     aspirin 81 MG tablet Take 1 tablet by mouth daily.  3     blood glucose monitoring (ACCU-CHEK ANA PLUS) test strip Use to test blood sugar 1 times daily or as directed. 1 Box 3     blood glucose monitoring (ACCU-CHEK FASTCLIX) lancets Use to test blood sugar 1 times daily or as directed. 100 each 3     furosemide (LASIX) 40 MG tablet Take 0.5 tablets (20 mg) by mouth daily 90 tablet 1     lamoTRIgine (LAMICTAL) 100 MG tablet Take 1 tablet (100 mg) by mouth 2 times daily 180 tablet 3     losartan (COZAAR) 50 MG tablet TAKE 1 TABLET(50 MG) BY MOUTH TWICE DAILY 180 tablet 3     metFORMIN (GLUCOPHAGE) 500 MG tablet Take 1 tablet (500 mg) by mouth daily (with dinner) 90 tablet 3     Multiple Vitamins-Minerals (CENTRUM SILVER ULTRA WOMENS PO) Take  by mouth daily. 30 0     order for DME Equipment being ordered: 1 pair diabetic shoes. 1 each 0     ranitidine (ZANTAC) 150 MG tablet Take  by mouth. As needed       simvastatin (ZOCOR) 20 MG tablet TAKE 1 TABLET BY MOUTH AT BEDTIME 90 tablet 0     Allergies   Allergen Reactions     Latex      Penicillins      Recent Labs   Lab Test 06/18/19  0718 11/20/18  1020 08/03/18  0751  06/22/17  0658  04/24/15  0802   A1C 6.6* 6.2* 6.3*   < > 6.7*   < > 7.1*   LDL 87  --  92  --   83   < > 73   HDL 67  --  69  --  62   < > 58   TRIG 93  --  85  --  99   < > 125   ALT  --   --  27  --  27  --  29   CR  --  0.85 0.87  --  0.84   < > 0.90   GFRESTIMATED  --  65 63  --  66   < > 61   GFRESTBLACK  --  78 76  --  79   < > 74   POTASSIUM  --  4.6 4.4  --  4.4   < > 4.3   TSH 0.77  --   --   --  1.09   < > 0.96    < > = values in this interval not displayed.      BP Readings from Last 3 Encounters:   07/30/19 132/73   10/11/18 124/71   02/06/18 114/70    Wt Readings from Last 3 Encounters:   07/30/19 103.4 kg (228 lb)   10/11/18 93 kg (205 lb)   02/06/18 98 kg (216 lb)                       Reviewed and updated as needed this visit by Provider         Review of Systems   ROS COMP: Constitutional, HEENT, cardiovascular, pulmonary, gi and gu systems are negative, except as otherwise noted.      Objective    /73 (BP Location: Left arm, Patient Position: Chair, Cuff Size: Adult Large)   Pulse 85   Temp 98.5  F (36.9  C) (Oral)   Wt 103.4 kg (228 lb)   SpO2 95%   BMI 35.71 kg/m    Body mass index is 35.71 kg/m .  Physical Exam   GENERAL: healthy, alert and no distress  EYES: Eyes grossly normal to inspection, PERRL and conjunctivae and sclerae normal  NECK: no adenopathy, no asymmetry, masses, or scars and thyroid normal to palpation  RESP: lungs clear to auscultation - no rales, rhonchi or wheezes  CV: regular rate and rhythm, normal S1 S2, no S3 or S4, no murmur, click or rub, no peripheral edema and peripheral pulses strong  ABDOMEN: soft, nontender, no hepatosplenomegaly, no masses and bowel sounds normal  MS: no gross musculoskeletal defects noted, no edema  SKIN: One 1/2 cm wart - left forehead.   destroyed with liquid NTG today  NEURO: Normal strength and tone, mentation intact and speech normal  NEURO: (+) straight leg raise, right with sharp pain past right knee.  Able to get relief by changing position.     Diagnostic Test Results:  Labs reviewed in Epic  none         Assessment &  Plan       ICD-10-CM    1. Diabetic polyneuropathy associated with type 2 diabetes mellitus (H) E11.42 Hemoglobin A1c   2. Sarcoidosis quiet D86.9    3. Essential hypertension with goal blood pressure less than 140/90 I10 Basic metabolic panel   4. Type 2 diabetes mellitus without complication, without long-term current use of insulin (H) E11.9 Albumin Random Urine Quantitative with Creat Ratio     metFORMIN (GLUCOPHAGE) 500 MG tablet   5. Wart of scalp B07.9 DESTRUCT BENIGN LESION, UP TO 14   6. Chronic right-sided low back pain with right-sided sciatica M54.41 predniSONE (DELTASONE) 10 MG tablet    G89.29 [facility] PT, HAND, AND CHIROPRACTIC REFERRAL          Patient Instructions   Prednisone 10 mg tabs  Four tabs daily X 3 d  Two tabs daily X 3 d  One tab daily X 3 d    Then stop    Physical therapy will call you    Call if symptoms don't improve... 3-4 weeks  ... MRI if symptoms dont improve...       Return to clinic November for physical with fasting labs prior.      Return in about 4 months (around 11/30/2019) for Physical Exam.    Johana Pelayo MD  [facility]

## 2019-07-31 DIAGNOSIS — E11.9 TYPE 2 DIABETES MELLITUS WITHOUT COMPLICATION, WITHOUT LONG-TERM CURRENT USE OF INSULIN (H): ICD-10-CM

## 2019-07-31 NOTE — TELEPHONE ENCOUNTER
Requested Prescriptions   Pending Prescriptions Disp Refills     metFORMIN (GLUCOPHAGE) 500 MG tablet [Pharmacy Med Name: METFORMIN 500MG TABLETS] 135 tablet 3     Sig: TAKE 1 TABLET(500 MG) BY MOUTH DAILY WITH DINNER. INCREASE TO 2 TIMES DAILY WHILE ON PREDNISONE   Last Written Prescription Date:  7-30-19  Last Fill Quantity: 100,  # refills: 3   Last office visit: 7/30/2019 with prescribing provider:  7-30-19   Future Office Visit:        Biguanide Agents Passed - 7/31/2019  7:46 AM        Passed - Blood pressure less than 140/90 in past 6 months     BP Readings from Last 3 Encounters:   07/30/19 132/73   10/11/18 124/71   02/06/18 114/70                 Passed - Patient has documented LDL within the past 12 mos.     Recent Labs   Lab Test 06/18/19  0718   LDL 87             Passed - Patient has had a Microalbumin in the past 15 mos.     Recent Labs   Lab Test 11/20/18  1034   MICROL <5   UMALCR Unable to calculate due to low value             Passed - Patient is age 10 or older        Passed - Patient has documented A1c within the specified period of time.     If HgbA1C is 8 or greater, it needs to be on file within the past 3 months.  If less than 8, must be on file within the past 6 months.     Recent Labs   Lab Test 06/18/19  0718   A1C 6.6*             Passed - Patient's CR is NOT>1.4 OR Patient's EGFR is NOT<45 within past 12 mos.     Recent Labs   Lab Test 11/20/18  1020   GFRESTIMATED 65   GFRESTBLACK 78       Recent Labs   Lab Test 11/20/18  1020   CR 0.85             Passed - Patient does NOT have a diagnosis of CHF.        Passed - Medication is active on med list        Passed - Patient is not pregnant        Passed - Patient has not had a positive pregnancy test within the past 12 mos.         Passed - Recent (6 mo) or future (30 days) visit within the authorizing provider's specialty     Patient had office visit in the last 6 months or has a visit in the next 30 days with authorizing provider or  "within the authorizing provider's specialty.  See \"Patient Info\" tab in inbasket, or \"Choose Columns\" in Meds & Orders section of the refill encounter.              "

## 2019-08-02 NOTE — TELEPHONE ENCOUNTER
"Note from pharmacy says patient requests 90 day supply, I see 100 tabs was sent by [facility] in office visit 7/30/19.   Patient takes one daily and increases to 2 while on prednisone.   Isn't 100 tabs 90 days plus?  Pharmacy is requesting 135 tabs with 3 refills.    I called pharmacy to discuss.  On hold over 4 minutes, spoke to pharmacy who says to leave it as the 100 tab dispense.  She says this was on auto-refill.    \"Refusal\" routed back to pharmacy with note.      Brigida Perez RN  [facility]      "

## 2019-08-12 ENCOUNTER — THERAPY VISIT (OUTPATIENT)
Dept: PHYSICAL THERAPY | Facility: CLINIC | Age: 79
End: 2019-08-12
Payer: MEDICARE

## 2019-08-12 DIAGNOSIS — M54.41 CHRONIC RIGHT-SIDED LOW BACK PAIN WITH RIGHT-SIDED SCIATICA: ICD-10-CM

## 2019-08-12 DIAGNOSIS — M54.41 RIGHT-SIDED LOW BACK PAIN WITH RIGHT-SIDED SCIATICA: ICD-10-CM

## 2019-08-12 DIAGNOSIS — G89.29 CHRONIC RIGHT-SIDED LOW BACK PAIN WITH RIGHT-SIDED SCIATICA: ICD-10-CM

## 2019-08-12 PROCEDURE — 97161 PT EVAL LOW COMPLEX 20 MIN: CPT | Mod: GP | Performed by: PHYSICAL THERAPIST

## 2019-08-12 PROCEDURE — 97110 THERAPEUTIC EXERCISES: CPT | Mod: GP | Performed by: PHYSICAL THERAPIST

## 2019-08-12 PROCEDURE — 97112 NEUROMUSCULAR REEDUCATION: CPT | Mod: GP | Performed by: PHYSICAL THERAPIST

## 2019-08-12 NOTE — PROGRESS NOTES
[facility] Initial Evaluation  Subjective:  The history is provided by the patient.   Type of problem:  Lumbar (R side)   Condition occurred with:  Insidious onset.    Problem details: Pain began about 2-3 months ago. PT referral on 7/30/19.    Radiates to:  Thigh right and lower leg right (began about 1 month ago). Associated symptoms:  Loss of strength, loss of motion/stiffness, numbness and tingling. Symptoms are exacerbated by standing, walking and lying down (Standing > walking) and relieved by rest.    Where condition occurred: for unknown reasons.  General health as reported by patient is good. Pertinent medical history includes:  Seizures, overweight, high blood pressure and diabetes.  Medical allergies: latex.  Surgeries include:  None.  Current medications:  Anti-seizure medication and high blood pressure medication.     Pain is described as aching and sharp (deep) and is intermittent. Pain is worse during the night. Since onset symptoms are gradually worsening.      Patient is Retired.   Barriers include:  None as reported by patient.  Red flags:  None as reported by patient.                      Objective:    Gait:  Cane only when having a painful day  Gait Type:  Antalgic   Assistive Devices:  Cane      Flexibility/Screens:       Lower Extremity:  Decreased left lower extremity flexibility:Hamstrings    Decreased right lower extremity flexibility:  Piriformis; IT Band and Hamstrings               Lumbar/SI Evaluation  ROM:  AROM Lumbar: normal (pain with flexion, L SB)      Lumbar Myotomes:  normal                Lumbar Dermtomes:  normal                Neural Tension/Mobility:      Right side:   Slump positive.  Right side:   SLR  negative.   Lumbar Palpation:      Tenderness not present at Left:    Quadratus Lumborum; Piriformis or Gluteus Medius  Tenderness present at Right: Quadratus Lumborum; Piriformis; Gluteus Medius and Vertebral        SI joint/Sacrum:        Left positive  at:    Sacral thrust  Right positive at:    Sacral thrust                                      Hip Evaluation  HIP AROM:  AROM:    Left Hip:     Normal    Right Hip:   Normal                    Hip Strength:    Flexion:   Left: 5/5   Pain:  Right: 4+/5   Pain:                    Extension:  Left: 4-/5  Pain:Right: 3+/5    Pain:    Abduction:  Left: 4/5     Pain:Right: 4/5    Pain:                  Hip Special Testing:       Right hip positive for the following special tests:  Willis and Marybeth's      Functional Testing:          Quad:      Bilateral leg squat:  Excessive anterior knee excursion and mild loss of control                  General     ROS    Assessment/Plan:    Patient is a 78 year old female with lumbar complaints.    Patient has the following significant findings with corresponding treatment plan.                Diagnosis 1:  R LBP/R LE  Pain -  hot/cold therapy, manual therapy, self management, education, directional preference exercise and home program  Decreased ROM/flexibility - manual therapy, therapeutic exercise and home program  Decreased strength - therapeutic exercise, therapeutic activities and home program    Therapy Evaluation Codes:   1) History comprised of:   Personal factors that impact the plan of care:      None.    Comorbidity factors that impact the plan of care are:      None.     Medications impacting care: None.  2) Examination of Body Systems comprised of:   Body structures and functions that impact the plan of care:      Hip, Knee and Lumbar spine.   Activity limitations that impact the plan of care are:      Bending, Sitting, Squatting/kneeling, Stairs, Standing, Walking, Sleeping and Laying down.  3) Clinical presentation characteristics are:   Stable/Uncomplicated.  4) Decision-Making    Low complexity using standardized patient assessment instrument and/or measureable assessment of functional outcome.  Cumulative Therapy Evaluation is: Low complexity.    Previous and current  functional limitations:  (See Goal Flow Sheet for this information)    Short term and Long term goals: (See Goal Flow Sheet for this information)     Communication ability:  Patient appears to be able to clearly communicate and understand verbal and written communication and follow directions correctly.  Treatment Explanation - The following has been discussed with the patient:   RX ordered/plan of care  Anticipated outcomes  Possible risks and side effects  This patient would benefit from PT intervention to resume normal activities.   Rehab potential is good.    Frequency:  1 X week, once daily  Duration:  for 8 weeks  Discharge Plan:  Achieve all LTG.  Independent in home treatment program.  Reach maximal therapeutic benefit.    Please refer to the daily flowsheet for treatment today, total treatment time and time spent performing 1:1 timed codes.

## 2019-08-12 NOTE — LETTER
DEPARTMENT OF HEALTH AND HUMAN SERVICES  CENTERS FOR MEDICARE & MEDICAID SERVICES    PLAN/UPDATED PLAN OF PROGRESS FOR OUTPATIENT REHABILITATION    PATIENTS NAME:  Phyllis Aleman   : 1940  PROVIDER NUMBER:    [id number]  HICN:  [id number]   PROVIDER NAME: [facility]  MEDICAL RECORD NUMBER: [id number]   START OF CARE DATE:  SOC Date: 19   TYPE:  PT  PRIMARY/TREATMENT DIAGNOSIS: (Pertinent Medical Diagnosis)     Chronic right-sided low back pain with right-sided sciatica  Right-sided low back pain with right-sided sciatica    VISITS FROM START OF CARE:  Rxs Used: 1     [facility] Initial Evaluation  Subjective:  The history is provided by the patient.   Type of problem:  Lumbar (R side)   Condition occurred with:  Insidious onset.    Problem details: Pain began about 2-3 months ago. PT referral on 19.    Radiates to:  Thigh right and lower leg right (began about 1 month ago). Associated symptoms:  Loss of strength, loss of motion/stiffness, numbness and tingling. Symptoms are exacerbated by standing, walking and lying down (Standing > walking) and relieved by rest.    Where condition occurred: for unknown reasons.  General health as reported by patient is good. Pertinent medical history includes:  Seizures, overweight, high blood pressure and diabetes.  Medical allergies: latex.  Surgeries include:  None.  Current medications:  Anti-seizure medication and high blood pressure medication.  Pain is described as aching and sharp (deep) and is intermittent. Pain is worse during the night. Since onset symptoms are gradually worsening.      Patient is Retired.   Barriers include:  None as reported by patient.  Red flags:  None as reported by patient.                  Objective:  Gait:  Cane only when having a painful day  Gait Type:  Antalgic   Assistive Devices:  Cane  Flexibility/Screens:   Lower Extremity:  Decreased left lower extremity  flexibility:Hamstrings  Decreased right lower extremity flexibility:  Piriformis; IT Band and Hamstrings       Lumbar/SI Evaluation  ROM:  AROM Lumbar: normal (pain with flexion, L SB)  Lumbar Myotomes:  normal  Lumbar Dermtomes:  normal  Neural Tension/Mobility:    Right side:   Slump positive.  Right side:   SLR  negative.   Lumbar Palpation:    Tenderness not present at Left:  Quadratus Lumborum; Piriformis or Gluteus Medius  Tenderness present at Right: Quadratus Lumborum; Piriformis; Gluteus Medius and Vertebral  SI joint/Sacrum:      Left positive at:    Sacral thrust  Right positive at:    Sacral thrust       Hip Evaluation  HIP AROM:  AROM:    Left Hip:     Normal    Right Hip:   Normal      PATIENTS NAME:  Phyllis Aleman   Hip Strength:    Flexion:   Left: 5/5    Right: 4+/5              Extension:  Left: 4-/5  Right: 3+/5     Abduction:  Left: 4/5    Right: 4/5        Hip Special Testing:    Right hip positive for the following special tests:  Willis and Marybeth's    Functional Testing:    Quad:    Bilateral leg squat:  Excessive anterior knee excursion and mild loss of control     Assessment/Plan:    Patient is a 78 year old female with lumbar complaints.    Patient has the following significant findings with corresponding treatment plan.                Diagnosis 1:  R LBP/R LE  Pain -  hot/cold therapy, manual therapy, self management, education, directional preference exercise and home program  Decreased ROM/flexibility - manual therapy, therapeutic exercise and home program  Decreased strength - therapeutic exercise, therapeutic activities and home program    Therapy Evaluation Codes:   1) History comprised of:   Personal factors that impact the plan of care:      None.    Comorbidity factors that impact the plan of care are:      None.     Medications impacting care: None.  2) Examination of Body Systems comprised of:   Body structures and functions that impact the plan of care:      Hip, Knee and  "Lumbar spine.   Activity limitations that impact the plan of care are:      Bending, Sitting, Squatting/kneeling, Stairs, Standing, Walking, Sleeping and Laying down.  3) Clinical presentation characteristics are:   Stable/Uncomplicated.  4) Decision-Making    Low complexity using standardized patient assessment instrument and/or measureable assessment of functional outcome.  Cumulative Therapy Evaluation is: Low complexity.  Previous and current functional limitations:  (See Goal Flow Sheet for this information)    Short term and Long term goals: (See Goal Flow Sheet for this information)   Communication ability:  Patient appears to be able to clearly communicate and understand verbal and written communication and follow directions correctly.  Treatment Explanation - The following has been discussed with the patient:   RX ordered/plan of care  Anticipated outcomes  Possible risks and side effects  This patient would benefit from PT intervention to resume normal activities.   Rehab potential is good.    Frequency:  1 X week, once daily  Duration:  for 8 weeks  Discharge Plan:  Achieve all LTG.  Independent in home treatment program.  Reach maximal therapeutic benefit.    Caregiver Signature/Credentials ____________________________ Date ________      Treating Provider: Dolores Calvert, PT   I have reviewed and certified the need for these services and plan of treatment while under my care.    PHYSICIAN'S SIGNATURE:   _________________________________________  Date___________    Johana Pelayo MD    Certification period:  Beginning of Cert date period: 08/12/19 to  End of Cert period date: 11/09/19     Functional Level Progress Report: Please see attached \"Goal Flow sheet for Functional level.\"    ____X____ Continue Services or       ________ DC Services     Service dates: From  SOC Date: 08/12/19 date to present                         "

## 2019-08-19 ENCOUNTER — TELEPHONE (OUTPATIENT)
Dept: FAMILY MEDICINE | Facility: CLINIC | Age: 79
End: 2019-08-19

## 2019-08-19 NOTE — TELEPHONE ENCOUNTER
Forms received from: [facility]   Phone number listed: [phone]   Fax listed: [phone]  Date received: 08/19/2019  Form description: Plan/Updated Plan of Progress for Outpatient Rehabilitation  Once forms are completed, please return to [facility] via fax.  Is patient requesting to be contacted when forms are completed: NA    Form placed: In provider's basket  Jeannine Kong

## 2019-08-21 DIAGNOSIS — R60.0 EDEMA OF EXTREMITIES: ICD-10-CM

## 2019-08-21 RX ORDER — FUROSEMIDE 40 MG
TABLET ORAL
Qty: 90 TABLET | Refills: 1 | Status: SHIPPED | OUTPATIENT
Start: 2019-08-21 | End: 2020-01-29

## 2019-08-21 NOTE — TELEPHONE ENCOUNTER
"Requested Prescriptions   Pending Prescriptions Disp Refills     furosemide (LASIX) 40 MG tablet [Pharmacy Med Name: FUROSEMIDE 40MG TABLETS] 90 tablet 0     Sig: TAKE 1 TABLET(40 MG) BY MOUTH DAILY   Last Written Prescription Date:  10-11-18  Last Fill Quantity: 90,  # refills: 1   Last office visit: 7/30/2019 with prescribing provider:  7-30-19   Future Office Visit:        Diuretics (Including Combos) Protocol Passed - 8/21/2019  6:42 AM        Passed - Blood pressure under 140/90 in past 12 months     BP Readings from Last 3 Encounters:   07/30/19 132/73   10/11/18 124/71   02/06/18 114/70                 Passed - Recent (12 mo) or future (30 days) visit within the authorizing provider's specialty     Patient had office visit in the last 12 months or has a visit in the next 30 days with authorizing provider or within the authorizing provider's specialty.  See \"Patient Info\" tab in inbasket, or \"Choose Columns\" in Meds & Orders section of the refill encounter.              Passed - Medication is active on med list        Passed - Patient is age 18 or older        Passed - No active pregancy on record        Passed - Normal serum creatinine on file in past 12 months     Recent Labs   Lab Test 11/20/18  1020   CR 0.85              Passed - Normal serum potassium on file in past 12 months     Recent Labs   Lab Test 11/20/18  1020   POTASSIUM 4.6                    Passed - Normal serum sodium on file in past 12 months     Recent Labs   Lab Test 11/20/18  1020                 Passed - No positive pregnancy test in past 12 months          "

## 2019-08-26 ENCOUNTER — THERAPY VISIT (OUTPATIENT)
Dept: PHYSICAL THERAPY | Facility: CLINIC | Age: 79
End: 2019-08-26
Payer: MEDICARE

## 2019-08-26 DIAGNOSIS — M54.41 RIGHT-SIDED LOW BACK PAIN WITH RIGHT-SIDED SCIATICA: ICD-10-CM

## 2019-08-26 PROCEDURE — 97112 NEUROMUSCULAR REEDUCATION: CPT | Mod: GP | Performed by: PHYSICAL THERAPIST

## 2019-08-26 PROCEDURE — 97110 THERAPEUTIC EXERCISES: CPT | Mod: GP | Performed by: PHYSICAL THERAPIST

## 2019-09-10 ENCOUNTER — THERAPY VISIT (OUTPATIENT)
Dept: PHYSICAL THERAPY | Facility: CLINIC | Age: 79
End: 2019-09-10
Payer: MEDICARE

## 2019-09-10 DIAGNOSIS — M54.41 RIGHT-SIDED LOW BACK PAIN WITH RIGHT-SIDED SCIATICA: ICD-10-CM

## 2019-09-10 PROCEDURE — 97110 THERAPEUTIC EXERCISES: CPT | Mod: GP | Performed by: PHYSICAL THERAPIST

## 2019-09-10 PROCEDURE — 97140 MANUAL THERAPY 1/> REGIONS: CPT | Mod: GP | Performed by: PHYSICAL THERAPIST

## 2019-09-30 ENCOUNTER — THERAPY VISIT (OUTPATIENT)
Dept: PHYSICAL THERAPY | Facility: CLINIC | Age: 79
End: 2019-09-30
Payer: MEDICARE

## 2019-09-30 DIAGNOSIS — M54.41 RIGHT-SIDED LOW BACK PAIN WITH RIGHT-SIDED SCIATICA: ICD-10-CM

## 2019-11-19 PROBLEM — M54.41 RIGHT-SIDED LOW BACK PAIN WITH RIGHT-SIDED SCIATICA: Status: RESOLVED | Noted: 2019-08-12 | Resolved: 2019-11-19

## 2019-11-19 NOTE — PROGRESS NOTES
Subjective:  HPI                    Objective:  System    Physical Exam    General     ROS    Assessment/Plan:    DISCHARGE REPORT    Progress reporting period is from 8/12/19 to 9/10/19.     SUBJECTIVE  Subjective: Pain isn't going as far down her leg (now stays just above knee). Happy about this. Back overall feels about the same.    Current Pain level: 3/10   Initial Pain level: 7/10   Changes in function: Yes, see goal flow sheet for change in function   Adverse reactions: None;   ,     Patient has failed to return to therapy so current objective findings are unknown.  The subjective and objective information are from the last SOAP note on this patient.    OBJECTIVE  Objective: Relief after B LE distraction today. Trendelenburg gait pattern (R). Reviewed entire HEP; pt requires many cues      ASSESSMENT/PLAN  Diagnosis 1:  R LBP/R LE  Pain -  hot/cold therapy, manual therapy, self management, education, directional preference exercise and home program  Decreased ROM/flexibility - manual therapy, therapeutic exercise and home program  Decreased strength - therapeutic exercise, therapeutic activities and home program  STG/LTGs have been met or progress has been made towards goals:  Yes (See Goal flow sheet completed today.)  Assessment of Progress: The patient has not returned to therapy. Current status is unknown.  Self Management Plans:  Patient has been instructed in a home treatment program.  Patient  has been instructed in self management of symptoms.  Phyllis continues to require the following intervention to meet STG and LTG's: PT intervention is no longer required to meet STG/LTG.  The patient failed to complete scheduled/ordered appointments so current information is unknown.  We will discharge this patient from PT.    Recommendations:  This patient is ready to be discharged from therapy and continue their home treatment program.    Please refer to the daily flowsheet for treatment today, total treatment  time and time spent performing 1:1 timed codes.

## 2019-12-02 DIAGNOSIS — E11.42 DIABETIC POLYNEUROPATHY ASSOCIATED WITH TYPE 2 DIABETES MELLITUS (H): ICD-10-CM

## 2019-12-02 DIAGNOSIS — I10 ESSENTIAL HYPERTENSION WITH GOAL BLOOD PRESSURE LESS THAN 140/90: Chronic | ICD-10-CM

## 2019-12-02 DIAGNOSIS — E11.9 TYPE 2 DIABETES MELLITUS WITHOUT COMPLICATION, WITHOUT LONG-TERM CURRENT USE OF INSULIN (H): ICD-10-CM

## 2019-12-02 LAB
ANION GAP SERPL CALCULATED.3IONS-SCNC: 5 MMOL/L (ref 3–14)
BUN SERPL-MCNC: 17 MG/DL (ref 7–30)
CALCIUM SERPL-MCNC: 10 MG/DL (ref 8.5–10.1)
CHLORIDE SERPL-SCNC: 103 MMOL/L (ref 94–109)
CO2 SERPL-SCNC: 29 MMOL/L (ref 20–32)
CREAT SERPL-MCNC: 1.02 MG/DL (ref 0.52–1.04)
CREAT UR-MCNC: 57 MG/DL
GFR SERPL CREATININE-BSD FRML MDRD: 53 ML/MIN/{1.73_M2}
GLUCOSE SERPL-MCNC: 121 MG/DL (ref 70–99)
HBA1C MFR BLD: 6.6 % (ref 0–5.6)
MICROALBUMIN UR-MCNC: <5 MG/L
MICROALBUMIN/CREAT UR: NORMAL MG/G CR (ref 0–25)
POTASSIUM SERPL-SCNC: 4.8 MMOL/L (ref 3.4–5.3)
SODIUM SERPL-SCNC: 137 MMOL/L (ref 133–144)

## 2019-12-02 PROCEDURE — 36415 COLL VENOUS BLD VENIPUNCTURE: CPT | Performed by: INTERNAL MEDICINE

## 2019-12-02 PROCEDURE — 82043 UR ALBUMIN QUANTITATIVE: CPT | Performed by: INTERNAL MEDICINE

## 2019-12-02 PROCEDURE — 83036 HEMOGLOBIN GLYCOSYLATED A1C: CPT | Performed by: INTERNAL MEDICINE

## 2019-12-02 PROCEDURE — 80048 BASIC METABOLIC PNL TOTAL CA: CPT | Performed by: INTERNAL MEDICINE

## 2019-12-02 NOTE — LETTER
[facility]  [address] [place]  [address]  [phone]        December 4, 2019    Phyllis Aleman  [address]  [facility] [address]        Dear Phyllis,    Enclosed are your results.  Your labs are normal/stable at this time.     Please call  with any questions.  We can also discuss any questions regarding these labs at your next scheduled visit.     Results for orders placed or performed in visit on 12/02/19   Albumin Random Urine Quantitative with Creat Ratio     Status: None   Result Value Ref Range    Creatinine Urine 57 mg/dL    Albumin Urine mg/L <5 mg/L    Albumin Urine mg/g Cr Unable to calculate due to low value 0 - 25 mg/g Cr   Hemoglobin A1c     Status: Abnormal   Result Value Ref Range    Hemoglobin A1C 6.6 (H) 0 - 5.6 %   Basic metabolic panel     Status: Abnormal   Result Value Ref Range    Sodium 137 133 - 144 mmol/L    Potassium 4.8 3.4 - 5.3 mmol/L    Chloride 103 94 - 109 mmol/L    Carbon Dioxide 29 20 - 32 mmol/L    Anion Gap 5 3 - 14 mmol/L    Glucose 121 (H) 70 - 99 mg/dL    Urea Nitrogen 17 7 - 30 mg/dL    Creatinine 1.02 0.52 - 1.04 mg/dL    GFR Estimate 53 (L) >60 mL/min/[1.73_m2]    GFR Estimate If Black 61 >60 mL/min/[1.73_m2]    Calcium 10.0 8.5 - 10.1 mg/dL         If you have any questions please call the clinic at [phone].    Sincerely,    Johana CORLEY

## 2019-12-05 ENCOUNTER — OFFICE VISIT (OUTPATIENT)
Dept: FAMILY MEDICINE | Facility: CLINIC | Age: 79
End: 2019-12-05
Payer: MEDICARE

## 2019-12-05 VITALS
HEART RATE: 91 BPM | SYSTOLIC BLOOD PRESSURE: 124 MMHG | BODY MASS INDEX: 35.71 KG/M2 | WEIGHT: 228 LBS | DIASTOLIC BLOOD PRESSURE: 75 MMHG | OXYGEN SATURATION: 94 % | TEMPERATURE: 97.8 F

## 2019-12-05 DIAGNOSIS — Z23 NEED FOR SHINGLES VACCINE: ICD-10-CM

## 2019-12-05 DIAGNOSIS — E11.9 TYPE 2 DIABETES MELLITUS WITHOUT COMPLICATION, WITHOUT LONG-TERM CURRENT USE OF INSULIN (H): ICD-10-CM

## 2019-12-05 DIAGNOSIS — R29.898 RIGHT LEG WEAKNESS: ICD-10-CM

## 2019-12-05 DIAGNOSIS — R60.0 EDEMA OF EXTREMITIES: ICD-10-CM

## 2019-12-05 DIAGNOSIS — Z00.01 ENCOUNTER FOR GENERAL ADULT MEDICAL EXAMINATION WITH ABNORMAL FINDINGS: Primary | ICD-10-CM

## 2019-12-05 DIAGNOSIS — Z00.00 ENCOUNTER FOR MEDICARE ANNUAL WELLNESS EXAM: ICD-10-CM

## 2019-12-05 DIAGNOSIS — E11.42 DIABETIC POLYNEUROPATHY ASSOCIATED WITH TYPE 2 DIABETES MELLITUS (H): ICD-10-CM

## 2019-12-05 DIAGNOSIS — Z23 NEED FOR PROPHYLACTIC VACCINATION AND INOCULATION AGAINST INFLUENZA: ICD-10-CM

## 2019-12-05 DIAGNOSIS — M17.11 PRIMARY OSTEOARTHRITIS OF RIGHT KNEE: ICD-10-CM

## 2019-12-05 DIAGNOSIS — D86.9 SARCOIDOSIS: ICD-10-CM

## 2019-12-05 DIAGNOSIS — I10 ESSENTIAL HYPERTENSION WITH GOAL BLOOD PRESSURE LESS THAN 140/90: Chronic | ICD-10-CM

## 2019-12-05 DIAGNOSIS — E78.5 HYPERLIPIDEMIA LDL GOAL <100: ICD-10-CM

## 2019-12-05 PROCEDURE — 99207 C FOOT EXAM  NO CHARGE: CPT | Mod: 25 | Performed by: INTERNAL MEDICINE

## 2019-12-05 PROCEDURE — G0008 ADMIN INFLUENZA VIRUS VAC: HCPCS | Performed by: INTERNAL MEDICINE

## 2019-12-05 PROCEDURE — 99213 OFFICE O/P EST LOW 20 MIN: CPT | Mod: 25 | Performed by: INTERNAL MEDICINE

## 2019-12-05 PROCEDURE — 90662 IIV NO PRSV INCREASED AG IM: CPT | Performed by: INTERNAL MEDICINE

## 2019-12-05 PROCEDURE — G0439 PPPS, SUBSEQ VISIT: HCPCS | Performed by: INTERNAL MEDICINE

## 2019-12-05 ASSESSMENT — ENCOUNTER SYMPTOMS
PARESTHESIAS: 0
DIARRHEA: 0
NAUSEA: 0
BREAST MASS: 0
HEADACHES: 0
ABDOMINAL PAIN: 0
EYE PAIN: 0
NERVOUS/ANXIOUS: 1
DIZZINESS: 0
COUGH: 0
HEMATOCHEZIA: 0
HEARTBURN: 0
DYSURIA: 0
WEAKNESS: 1
SORE THROAT: 0
SHORTNESS OF BREATH: 0
HEMATURIA: 0
FREQUENCY: 0
PALPITATIONS: 0
CONSTIPATION: 0
JOINT SWELLING: 1
CHILLS: 0

## 2019-12-05 ASSESSMENT — ACTIVITIES OF DAILY LIVING (ADL): CURRENT_FUNCTION: NO ASSISTANCE NEEDED

## 2019-12-05 NOTE — PATIENT INSTRUCTIONS
Patient Education   Personalized Prevention Plan  You are due for the preventive services outlined below.  Your care team is available to assist you in scheduling these services.  If you have already completed any of these items, please share that information with your care team to update in your medical record.  Health Maintenance Due   Topic Date Due     Zoster (Shingles) Vaccine (2 of 3) 11/16/2007     Diabetic Foot Exam  02/06/2019     Flu Vaccine (1) 09/01/2019     Annual Wellness Visit  10/11/2019     FALL RISK ASSESSMENT  10/11/2019     Osteoporosis Screening  11/16/2019           RETURN TO CLINIC  6 months with fasting labs prior (6/2020)    Physical therapy will call you

## 2019-12-05 NOTE — PROGRESS NOTES
"SUBJECTIVE:   Phyllis Aleman is a 78 year old female who presents for Preventive Visit.    77 y/o F here for AFE.  H/o  uncomplicated DM II, sz disorder (Lamictal), quiet sarcoidosis, sciatica    Recent BMP is normal, A1C is 6.6.         Are you in the first 12 months of your Medicare coverage?  No    Healthy Habits:     In general, how would you rate your overall health?  Good    Frequency of exercise:  None    Do you usually eat at least 4 servings of fruit and vegetables a day, include whole grains    & fiber and avoid regularly eating high fat or \"junk\" foods?  No    Taking medications regularly:  Yes    Medication side effects:  None    Ability to successfully perform activities of daily living:  No assistance needed    Home Safety:  No safety concerns identified    Hearing Impairment:  Difficulty following a conversation in a noisy restaurant or crowded room, difficulty understanding soft or whispered speech and difficulty understanding speech on the telephone    In the past 6 months, have you been bothered by leaking of urine?  No    In general, how would you rate your overall mental or emotional health?  Good      PHQ-2 Total Score: 2    Additional concerns today:  No    Do you feel safe in your environment? Yes    Have you ever done Advance Care Planning? (For example, a Health Directive, POLST, or a discussion with a medical provider or your loved ones about your wishes): Yes, advance care planning is on file.      Fall risk       Cognitive Screening   1) Repeat 3 items (Leader, Season, Table)    2) Clock draw: NORMAL  3) 3 item recall: Recalls 2 objects   Results: NORMAL clock, 1-2 items recalled: COGNITIVE IMPAIRMENT LESS LIKELY    Mini-CogTM Copyright VIKKI Ogden. Licensed by the author for use in [facility]; reprinted with permission (mehul@.[facility]). All rights reserved.      Do you have sleep apnea, excessive snoring or daytime drowsiness?: no    Reviewed and updated as needed this " visit by clinical staff         Reviewed and updated as needed this visit by Provider        Social History     Tobacco Use     Smoking status: Never Smoker     Smokeless tobacco: Never Used   Substance Use Topics     Alcohol use: No     Alcohol/week: 0.0 standard drinks     If you drink alcohol do you typically have >3 drinks per day or >7 drinks per week? No    Alcohol Use 12/5/2019   Prescreen: >3 drinks/day or >7 drinks/week? Not Applicable   Prescreen: >3 drinks/day or >7 drinks/week? -           Right knee pain and ankle and feet swelling    Current providers sharing in care for this patient include:   Patient Care Team:  Johana Pelayo MD as PCP - General  Johana Pelayo MD as Assigned PCP    The following health maintenance items are reviewed in Epic and correct as of today:  Health Maintenance   Topic Date Due     ZOSTER IMMUNIZATION (2 of 3) 11/16/2007     DIABETIC FOOT EXAM  02/06/2019     INFLUENZA VACCINE (1) 09/01/2019     MEDICARE ANNUAL WELLNESS VISIT  10/11/2019     FALL RISK ASSESSMENT  10/11/2019     DEXA  11/16/2019     EYE EXAM  02/13/2020     A1C  06/02/2020     LIPID  06/18/2020     BMP  12/02/2020     MICROALBUMIN  12/02/2020     TSH W/FREE T4 REFLEX  06/18/2021     ADVANCE CARE PLANNING  10/07/2021     DTAP/TDAP/TD IMMUNIZATION (3 - Td) 10/01/2022     COLONOSCOPY  12/05/2023     PHQ-2  Completed     PNEUMOCOCCAL IMMUNIZATION 65+ LOW/MEDIUM RISK  Completed     IPV IMMUNIZATION  Aged Out     MENINGITIS IMMUNIZATION  Aged Out     Labs reviewed in EPIC  BP Readings from Last 3 Encounters:   12/05/19 124/75   07/30/19 132/73   10/11/18 124/71    Wt Readings from Last 3 Encounters:   12/05/19 103.4 kg (228 lb)   07/30/19 103.4 kg (228 lb)   10/11/18 93 kg (205 lb)                  Patient Active Problem List   Diagnosis     Colon polyp     Pseudophakia OU     Sarcoidosis quiet     Advanced directives, counseling/discussion     Hyperlipidemia LDL goal <100     Endolymphatic hydrops      Edema of extremities     Localization-related focal epilepsy with simple partial seizures (H)     PVD (posterior vitreous detachment), bilateral     overweight  HCC     Essential hypertension with goal blood pressure less than 140/90     Choroidal nevus of left eye     Diabetic polyneuropathy associated with type 2 diabetes mellitus (H)     Nail dystrophy     Type 2 diabetes mellitus without complication, without long-term current use of insulin (H)     Overweight HCC     Past Surgical History:   Procedure Laterality Date     BIOPSY  1997    For sarcoidosis.      CATARACT IOL, RT/LT  2003, 2006    right, left - Dr. Cuevas     COLONOSCOPY       HC REMOVAL GALLBLADDER  2003     TONSILLECTOMY         Social History     Tobacco Use     Smoking status: Never Smoker     Smokeless tobacco: Never Used   Substance Use Topics     Alcohol use: No     Alcohol/week: 0.0 standard drinks     Family History   Problem Relation Age of Onset     Diabetes Mother      Heart Disease Father      Hypertension Son          Current Outpatient Medications   Medication Sig Dispense Refill     aspirin 81 MG tablet Take 1 tablet by mouth daily.  3     blood glucose monitoring (ACCU-CHEK ANA PLUS) test strip Use to test blood sugar 1 times daily or as directed. 1 Box 3     blood glucose monitoring (ACCU-CHEK FASTCLIX) lancets Use to test blood sugar 1 times daily or as directed. 100 each 3     furosemide (LASIX) 40 MG tablet TAKE 1 TABLET(40 MG) BY MOUTH DAILY 90 tablet 1     lamoTRIgine (LAMICTAL) 100 MG tablet Take 1 tablet (100 mg) by mouth 2 times daily 180 tablet 3     losartan (COZAAR) 50 MG tablet TAKE 1 TABLET(50 MG) BY MOUTH TWICE DAILY 180 tablet 3     metFORMIN (GLUCOPHAGE) 500 MG tablet Take 1 tablet (500 mg) by mouth daily (with dinner) (Increase to twice daily while on prednisone) 100 tablet 3     Multiple Vitamins-Minerals (CENTRUM SILVER ULTRA WOMENS PO) Take  by mouth daily. 30 0     order for DME Equipment being ordered: 1 pair  diabetic shoes. 1 each 0     simvastatin (ZOCOR) 20 MG tablet TAKE 1 TABLET BY MOUTH AT BEDTIME 90 tablet 3     zoster vaccine recombinant adjuvanted (SHINGRIX) injection Inject 0.5 mLs into the muscle once for 1 dose 0.5 mL 1     furosemide (LASIX) 40 MG tablet Take 0.5 tablets (20 mg) by mouth daily (Patient not taking: Reported on 12/5/2019) 90 tablet 1     Allergies   Allergen Reactions     Latex      Penicillins      Recent Labs   Lab Test 12/02/19  1039 06/18/19  0718 11/20/18  1020 08/03/18  0751  06/22/17  0658  04/24/15  0802   A1C 6.6* 6.6* 6.2* 6.3*   < > 6.7*   < > 7.1*   LDL  --  87  --  92  --  83   < > 73   HDL  --  67  --  69  --  62   < > 58   TRIG  --  93  --  85  --  99   < > 125   ALT  --   --   --  27  --  27  --  29   CR 1.02  --  0.85 0.87  --  0.84   < > 0.90   GFRESTIMATED 53*  --  65 63  --  66   < > 61   GFRESTBLACK 61  --  78 76  --  79   < > 74   POTASSIUM 4.8  --  4.6 4.4  --  4.4   < > 4.3   TSH  --  0.77  --   --   --  1.09   < > 0.96    < > = values in this interval not displayed.           Review of Systems   Constitutional: Negative for chills.   HENT: Positive for hearing loss. Negative for congestion, ear pain and sore throat.    Eyes: Negative for pain and visual disturbance.   Respiratory: Negative for cough and shortness of breath.    Cardiovascular: Positive for peripheral edema. Negative for chest pain and palpitations.   Gastrointestinal: Negative for abdominal pain, constipation, diarrhea, heartburn, hematochezia and nausea.   Breasts:  Negative for tenderness, breast mass and discharge.   Genitourinary: Negative for dysuria, frequency, genital sores, hematuria, pelvic pain, urgency, vaginal bleeding and vaginal discharge.   Musculoskeletal: Positive for joint swelling.   Skin: Negative for rash.   Neurological: Positive for weakness. Negative for dizziness, headaches and paresthesias.        Weaker right leg due to dejenerative joint arthritis right knee..   "  Psychiatric/Behavioral: Negative for mood changes. The patient is nervous/anxious.         Stressor:  Grand daughter with chronic pancreatitis, is very ill.  She has 5 y/o twin girls.           OBJECTIVE:   /75 (BP Location: Right arm, Patient Position: Sitting, Cuff Size: Adult Large)   Pulse 91   Temp 97.8  F (36.6  C) (Oral)   Wt 103.4 kg (228 lb)   SpO2 94%   BMI 35.71 kg/m   Estimated body mass index is 35.71 kg/m  as calculated from the following:    Height as of 10/11/18: 1.702 m (5' 7\").    Weight as of 7/30/19: 103.4 kg (228 lb).     Physical Exam  GENERAL APPEARANCE: healthy, alert and no distress  EYES: Eyes grossly normal to inspection, PERRL and conjunctivae and sclerae normal  HENT: ear canals and TM's normal, nose and mouth without ulcers or lesions, oropharynx clear and oral mucous membranes moist  [place] IRA, wears Aids.   NECK: no adenopathy, no asymmetry, masses, or scars and thyroid normal to palpation  RESP: lungs clear to auscultation - no rales, rhonchi or wheezes  BREAST: normal without masses, tenderness or nipple discharge and no palpable axillary masses or adenopathy  CV: regular rate and rhythm, normal S1 S2, no S3 or S4, no murmur, click or rub, no peripheral edema and peripheral pulses strong  ABDOMEN: soft, nontender, no hepatosplenomegaly, no masses and bowel sounds normal   (female): normal female external genitalia, normal urethral meatus, vaginal mucosal atrophy noted, normal cervix, adnexae, and uterus without masses or abnormal discharge   (female):  Bimanual only  MS: no musculoskeletal defects are noted and gait is age appropriate without ataxia   Bilateral knee dejenerative joint arthritis changes.   SKIN: no suspicious lesions or rashes  NEURO: Normal strength and tone, sensory exam grossly normal, mentation intact and speech normal  DIABETIC FOOT EXAM: normal DP and PT pulses, no trophic changes or ulcerative lesions and normal sensory exam--diminished at balls of " "feet only (filament test)  PSYCH: mentation appears normal and affect normal/bright    Diagnostic Test Results:  Labs reviewed in Epic  No results found for this or any previous visit (from the past 24 hour(s)).  Recent BMP normal and HgbA1C 6.6         ASSESSMENT / PLAN:       ICD-10-CM    1. Encounter for general adult medical examination with abnormal findings Z00.01    2. Essential hypertension with goal blood pressure less than 140/90 I10 OFFICE/OUTPT VISIT,EST,LEVL III   3. Diabetic polyneuropathy associated with type 2 diabetes mellitus (H) E11.42    4. Type 2 diabetes mellitus without complication, without long-term current use of insulin (H) E11.9 Hemoglobin A1c     FOOT EXAM     OFFICE/OUTPT VISIT,EST,LEVL III   5. Edema of extremities R60.0    6. Hyperlipidemia LDL goal <100 E78.5 Lipid panel reflex to direct LDL Fasting   7. Sarcoidosis quiet D86.9    8. Encounter for Medicare annual wellness exam Z00.00    9. Need for prophylactic vaccination and inoculation against influenza Z23 INFLUENZA (HIGH DOSE) 3 VALENT VACCINE [92216]     ADMIN INFLUENZA (For MEDICARE Patients ONLY) []   10. Need for shingles vaccine Z23 zoster vaccine recombinant adjuvanted (SHINGRIX) injection   11. Primary osteoarthritis of right knee M17.11 [facility] PT, HAND, AND CHIROPRACTIC REFERRAL   12. Right leg weakness R29.898 [facility] PT, HAND, AND CHIROPRACTIC REFERRAL       She will get shingrix in 2020.  Rx sent  Doing well, good health.      COUNSELING:  Reviewed preventive health counseling, as reflected in patient instructions       Immunizations    Discussed shingrix.      She would like to resume therapy for R knee dejenerative joint arthritis and associated right leg weakness.  She got great results w/therapy in past.      Estimated body mass index is 35.71 kg/m  as calculated from the following:    Height as of 10/11/18: 1.702 m (5' 7\").    Weight as of 7/30/19: 103.4 kg (228 lb).    Weight management plan: will decrease " portions.       reports that she has never smoked. She has never used smokeless tobacco.      Appropriate preventive services were discussed with this patient, including applicable screening as appropriate for cardiovascular disease, diabetes, osteopenia/osteoporosis, and glaucoma.  As appropriate for age/gender, discussed screening for colorectal cancer, prostate cancer, breast cancer, and cervical cancer. Checklist reviewing preventive services available has been given to the patient.    Reviewed patients plan of care and provided an AVS. The Basic Care Plan (routine screening as documented in Health Maintenance) for Phyllis meets the Care Plan requirement. This Care Plan has been established and reviewed with the Patient.    Counseling Resources:  ATP IV Guidelines  Pooled Cohorts Equation Calculator  Breast Cancer Risk Calculator  FRAX Risk Assessment  ICSI Preventive Guidelines  Dietary Guidelines for Americans, 2010  USDA's MyPlate  ASA Prophylaxis  Lung CA Screening    Johana Pelayo MD  [facility]    Identified Health Risks:

## 2020-01-02 ENCOUNTER — THERAPY VISIT (OUTPATIENT)
Dept: PHYSICAL THERAPY | Facility: CLINIC | Age: 80
End: 2020-01-02
Attending: INTERNAL MEDICINE
Payer: COMMERCIAL

## 2020-01-02 DIAGNOSIS — M25.561 RIGHT KNEE PAIN: ICD-10-CM

## 2020-01-02 PROCEDURE — 97112 NEUROMUSCULAR REEDUCATION: CPT | Mod: GP | Performed by: PHYSICAL THERAPIST

## 2020-01-02 PROCEDURE — 97110 THERAPEUTIC EXERCISES: CPT | Mod: GP | Performed by: PHYSICAL THERAPIST

## 2020-01-02 PROCEDURE — 97161 PT EVAL LOW COMPLEX 20 MIN: CPT | Mod: GP | Performed by: PHYSICAL THERAPIST

## 2020-01-02 ASSESSMENT — ACTIVITIES OF DAILY LIVING (ADL)
KNEEL ON THE FRONT OF YOUR KNEE: I AM UNABLE TO DO THE ACTIVITY
RISE FROM A CHAIR: ACTIVITY IS FAIRLY DIFFICULT
GIVING WAY, BUCKLING OR SHIFTING OF KNEE: I HAVE THE SYMPTOM BUT IT DOES NOT AFFECT MY ACTIVITY
KNEE_ACTIVITY_OF_DAILY_LIVING_SUM: 36
KNEE_ACTIVITY_OF_DAILY_LIVING_SCORE: 51.43
PAIN: THE SYMPTOM AFFECTS MY ACTIVITY SLIGHTLY
STAND: I AM UNABLE TO DO THE ACTIVITY
GO DOWN STAIRS: ACTIVITY IS FAIRLY DIFFICULT
SWELLING: THE SYMPTOM AFFECTS MY ACTIVITY SLIGHTLY
AS_A_RESULT_OF_YOUR_KNEE_INJURY,_HOW_WOULD_YOU_RATE_YOUR_CURRENT_LEVEL_OF_DAILY_ACTIVITY?: ABNORMAL
SQUAT: I AM UNABLE TO DO THE ACTIVITY
WALK: ACTIVITY IS FAIRLY DIFFICULT
GO UP STAIRS: ACTIVITY IS SOMEWHAT DIFFICULT
RAW_SCORE: 36
WEAKNESS: I HAVE THE SYMPTOM BUT IT DOES NOT AFFECT MY ACTIVITY
SIT WITH YOUR KNEE BENT: ACTIVITY IS NOT DIFFICULT
LIMPING: I DO NOT HAVE THE SYMPTOM
HOW_WOULD_YOU_RATE_THE_CURRENT_FUNCTION_OF_YOUR_KNEE_DURING_YOUR_USUAL_DAILY_ACTIVITIES_ON_A_SCALE_FROM_0_TO_100_WITH_100_BEING_YOUR_LEVEL_OF_KNEE_FUNCTION_PRIOR_TO_YOUR_INJURY_AND_0_BEING_THE_INABILITY_TO_PERFORM_ANY_OF_YOUR_USUAL_DAILY_ACTIVITIES?: 60
HOW_WOULD_YOU_RATE_THE_OVERALL_FUNCTION_OF_YOUR_KNEE_DURING_YOUR_USUAL_DAILY_ACTIVITIES?: ABNORMAL
STIFFNESS: THE SYMPTOM AFFECTS MY ACTIVITY SLIGHTLY

## 2020-01-02 NOTE — PROGRESS NOTES
[facility] Initial Evaluation  Subjective:  The history is provided by the patient.   Type of problem:  Right knee   Condition occurred with:  Insidious onset. This is a recurrent condition   Problem details: Knee pain has been off and on for about 3 years, but has been worse in the last 2-3 months.   Patient reports pain:  Anterior and lateral. Radiates to:  Hip. Associated symptoms:  Loss of strength, buckling/giving out, tingling and loss of motion/stiffness. Symptoms are exacerbated by descending stairs, ascending stairs, kneeling, lying on the extremity, bending/squatting, sitting, standing and transfers and relieved by rest and NSAID's.    Where condition occurred: for unknown reasons.  and reported as 7/10 on pain scale. General health as reported by patient is good. Pertinent medical history includes:  Cancer, diabetes, high blood pressure, seizures and overweight (skin).  Medical allergies: latex.  Surgeries include:  None.  Current medications:  Anti-seizure medication and high blood pressure medication.     Pain is described as aching and is intermittent. Pain timing: no pattern. Since onset symptoms are gradually improving.      Patient is Retired.   Barriers include:  None as reported by patient.  Red flags:  None as reported by patient.                      Objective:  System                                                Knee Evaluation:  ROM:  AROM: normal                Special Tests:     Right knee positive for the following tests:  Patellar Compression and Patellar Tracking-Abduction Lateral  Palpation:      Right knee tenderness present at:  Medial Joint Line; Lateral Joint Line; IT Band; Patellar Medial and Patellar Lateral  Edema:  Edema of the knee: min edema about fibular head area.    Mobility Testing:      Patellofemoral Medial:  Right: hypomobile  Patellofemoral Lateral:  Right: hypomobile      Functional Testing:          Quad:    Single Leg Squat:  Left:      Right:         Bilateral Leg Squat:   Excessive anterior knee excursion and mild loss of control              General     ROS    Assessment/Plan:    Patient is a 79 year old female with right side knee complaints.    Patient has the following significant findings with corresponding treatment plan.                Diagnosis 1:  R knee pain  Pain -  hot/cold therapy, manual therapy, self management, education, directional preference exercise and home program  Decreased ROM/flexibility - manual therapy, therapeutic exercise and home program  Decreased strength - therapeutic exercise, therapeutic activities and home program    Therapy Evaluation Codes:   1) History comprised of:   Personal factors that impact the plan of care:      None.    Comorbidity factors that impact the plan of care are:      None.     Medications impacting care: None.  2) Examination of Body Systems comprised of:   Body structures and functions that impact the plan of care:      Hip, Knee and Lumbar spine.   Activity limitations that impact the plan of care are:      Bending, Dressing, Sitting, Squatting/kneeling, Stairs, Standing, Walking, Working and Sleeping.  3) Clinical presentation characteristics are:   Stable/Uncomplicated.  4) Decision-Making    Low complexity using standardized patient assessment instrument and/or measureable assessment of functional outcome.  Cumulative Therapy Evaluation is: Low complexity.    Previous and current functional limitations:  (See Goal Flow Sheet for this information)    Short term and Long term goals: (See Goal Flow Sheet for this information)     Communication ability:  Patient appears to be able to clearly communicate and understand verbal and written communication and follow directions correctly.  Treatment Explanation - The following has been discussed with the patient:   RX ordered/plan of care  Anticipated outcomes  Possible risks and side effects  This patient would benefit from PT intervention to resume  normal activities.   Rehab potential is good.    Frequency:  1 X week, once daily  Duration:  for 8 weeks  Discharge Plan:  Achieve all LTG.  Independent in home treatment program.  Reach maximal therapeutic benefit.    Please refer to the daily flowsheet for treatment today, total treatment time and time spent performing 1:1 timed codes.

## 2020-01-03 PROBLEM — M25.561 RIGHT KNEE PAIN: Status: ACTIVE | Noted: 2020-01-03

## 2020-01-27 DIAGNOSIS — R60.0 EDEMA OF EXTREMITIES: ICD-10-CM

## 2020-01-28 NOTE — TELEPHONE ENCOUNTER
"Requested Prescriptions   Pending Prescriptions Disp Refills     furosemide (LASIX) 40 MG tablet [Pharmacy Med Name: FUROSEMIDE 40MG TABLETS] 90 tablet 1     Sig: TAKE 1 TABLET(40 MG) BY MOUTH DAILY   Last Written Prescription Date:  8-21-19  Last Fill Quantity: 90,  # refills: 1   Last office visit: 12/5/2019 with prescribing provider:  12-5-19   Future Office Visit:        Diuretics (Including Combos) Protocol Passed - 1/27/2020  1:32 PM        Passed - Blood pressure under 140/90 in past 12 months     BP Readings from Last 3 Encounters:   12/05/19 124/75   07/30/19 132/73   10/11/18 124/71                 Passed - Recent (12 mo) or future (30 days) visit within the authorizing provider's specialty     Patient has had an office visit with the authorizing provider or a provider within the authorizing providers department within the previous 12 mos or has a future within next 30 days. See \"Patient Info\" tab in inbasket, or \"Choose Columns\" in Meds & Orders section of the refill encounter.              Passed - Medication is active on med list        Passed - Patient is age 18 or older        Passed - No active pregancy on record        Passed - Normal serum creatinine on file in past 12 months     Recent Labs   Lab Test 12/02/19  1039   CR 1.02              Passed - Normal serum potassium on file in past 12 months     Recent Labs   Lab Test 12/02/19  1039   POTASSIUM 4.8                    Passed - Normal serum sodium on file in past 12 months     Recent Labs   Lab Test 12/02/19  1039                 Passed - No positive pregnancy test in past 12 months          "

## 2020-01-29 RX ORDER — FUROSEMIDE 40 MG
TABLET ORAL
Qty: 90 TABLET | Refills: 1 | Status: SHIPPED | OUTPATIENT
Start: 2020-01-29 | End: 2020-07-22

## 2020-01-29 NOTE — TELEPHONE ENCOUNTER
Prescription approved per [facility].    Ella Omalley, RN, BSN, PHN  [facility]: [place]

## 2020-02-17 ENCOUNTER — OFFICE VISIT (OUTPATIENT)
Dept: OPHTHALMOLOGY | Facility: CLINIC | Age: 80
End: 2020-02-17
Payer: COMMERCIAL

## 2020-02-17 DIAGNOSIS — H35.3131 EARLY DRY STAGE NONEXUDATIVE AGE-RELATED MACULAR DEGENERATION OF BOTH EYES: ICD-10-CM

## 2020-02-17 DIAGNOSIS — Z96.1 PSEUDOPHAKIA: ICD-10-CM

## 2020-02-17 DIAGNOSIS — H52.223 REGULAR ASTIGMATISM OF BOTH EYES: ICD-10-CM

## 2020-02-17 DIAGNOSIS — D31.32 CHOROIDAL NEVUS OF LEFT EYE: ICD-10-CM

## 2020-02-17 DIAGNOSIS — E11.9 TYPE 2 DIABETES MELLITUS WITHOUT COMPLICATION, WITHOUT LONG-TERM CURRENT USE OF INSULIN (H): ICD-10-CM

## 2020-02-17 DIAGNOSIS — Z01.00 EXAMINATION OF EYES AND VISION: Primary | ICD-10-CM

## 2020-02-17 DIAGNOSIS — H52.12 MYOPIA OF LEFT EYE: ICD-10-CM

## 2020-02-17 DIAGNOSIS — H52.4 PRESBYOPIA: ICD-10-CM

## 2020-02-17 DIAGNOSIS — H43.813 PVD (POSTERIOR VITREOUS DETACHMENT), BILATERAL: ICD-10-CM

## 2020-02-17 PROCEDURE — 92014 COMPRE OPH EXAM EST PT 1/>: CPT | Performed by: STUDENT IN AN ORGANIZED HEALTH CARE EDUCATION/TRAINING PROGRAM

## 2020-02-17 PROCEDURE — 92015 DETERMINE REFRACTIVE STATE: CPT | Performed by: STUDENT IN AN ORGANIZED HEALTH CARE EDUCATION/TRAINING PROGRAM

## 2020-02-17 ASSESSMENT — VISUAL ACUITY
METHOD: SNELLEN - LINEAR
OD_CC: 20/50
OS_CC: 20/20
CORRECTION_TYPE: GLASSES
OS_CC+: -2
OD_PH_CC: 20/40

## 2020-02-17 ASSESSMENT — REFRACTION_MANIFEST
OS_SPHERE: -0.75
OD_AXIS: 015
OS_CYLINDER: +1.50
OS_ADD: +2.50
OD_SPHERE: -0.25
OS_AXIS: 165
OD_CYLINDER: +0.50
OD_ADD: +2.50

## 2020-02-17 ASSESSMENT — REFRACTION_WEARINGRX
OD_ADD: +2.75
OS_CYLINDER: +1.25
OS_AXIS: 174
OS_ADD: +2.75
SPECS_TYPE: PAL
OD_CYLINDER: +1.25
OD_AXIS: 016
OD_SPHERE: +0.75
OS_SPHERE: -0.50

## 2020-02-17 ASSESSMENT — CONF VISUAL FIELD
OD_NORMAL: 1
METHOD: COUNTING FINGERS
OS_NORMAL: 1

## 2020-02-17 ASSESSMENT — CUP TO DISC RATIO
OS_RATIO: 0.3
OD_RATIO: 0.35

## 2020-02-17 ASSESSMENT — SLIT LAMP EXAM - LIDS
COMMENTS: 1+ DERMATOCHALASIS
COMMENTS: 1+ DERMATOCHALASIS

## 2020-02-17 ASSESSMENT — TONOMETRY
IOP_METHOD: APPLANATION
OD_IOP_MMHG: 19
OS_IOP_MMHG: 19

## 2020-02-17 ASSESSMENT — EXTERNAL EXAM - LEFT EYE: OS_EXAM: NORMAL

## 2020-02-17 ASSESSMENT — EXTERNAL EXAM - RIGHT EYE: OD_EXAM: NORMAL

## 2020-02-17 NOTE — PROGRESS NOTES
Current Eye Medications:  none     Subjective:  Complete eye exam. Vision is doing pretty well distance and near both eyes. No eye pain or discomfort in either eye.     Diabetic for about 15 years. Blood sugar has been doing pretty well.   Lab Results   Component Value Date    A1C 6.6 12/02/2019    A1C 6.6 06/18/2019    A1C 6.2 11/20/2018    A1C 6.3 08/03/2018    A1C 6.2 01/30/2018      Objective:  See Ophthalmology Exam.       Assessment:  Phyllis Aleman is a 79 year old female who presents with:   Encounter Diagnoses   Name Primary?     Examination of eyes and vision Yes     Myopia of left eye      Regular astigmatism of both eyes      Presbyopia        Early dry stage nonexudative age-related macular degeneration of both eyes Discussed AREDS2 eye vitamins     Type 2 diabetes mellitus without complication, without long-term current use of insulin (H)      Pseudophakia OU      Choroidal nevus of left eye      PVD (posterior vitreous detachment), bilateral        Plan:  Glasses prescription given     Keep blood sugars and blood pressure under good control.    Recommend taking an eye vitamin with AREDS2 on the packaging (like Preservision, iCaps, or generic). Follow dosing directions on the package.      Shirley Edwards MD  [phone]    Patient Education   Diabetes weakens the blood vessels all over the body, including the eyes. Damage to the blood vessels in the eyes can cause swelling or bleeding into part of the eye (called the retina). This is called diabetic retinopathy (CHARLA-tin--puh-thee). If not treated, this disease can cause vision loss or blindness.   Symptoms may include blurred or distorted vision, but many people have no symptoms. It's important to see your eye doctor regularly to check for problems.   Early treatment and good control can help protect your vision. Here are the things you can do to help prevent vision loss:      1. Keep your blood sugar levels under tight control.      2.  Bring high blood pressure under control.      3. No smoking.      4. Have yearly dilated eye exams.

## 2020-02-17 NOTE — PATIENT INSTRUCTIONS
Glasses prescription given     Keep blood sugars and blood pressure under good control.    Recommend taking an eye vitamin with AREDS2 on the packaging (like Preservision, iCaps, or generic). Follow dosing directions on the package.      Shirley Edwards MD  [phone]    Patient Education   Diabetes weakens the blood vessels all over the body, including the eyes. Damage to the blood vessels in the eyes can cause swelling or bleeding into part of the eye (called the retina). This is called diabetic retinopathy (CHARLA-tin--[facility]-thee). If not treated, this disease can cause vision loss or blindness.   Symptoms may include blurred or distorted vision, but many people have no symptoms. It's important to see your eye doctor regularly to check for problems.   Early treatment and good control can help protect your vision. Here are the things you can do to help prevent vision loss:      1. Keep your blood sugar levels under tight control.      2. Bring high blood pressure under control.      3. No smoking.      4. Have yearly dilated eye exams.

## 2020-02-17 NOTE — LETTER
2/17/2020         RE: Phyllis Aleman  [address]  [facility] [address]        Dear Colleague,    Thank you for referring your patient, Phyllis Aleman, to the [facility]. Please see a copy of my visit note below.     Current Eye Medications:  none     Subjective:  Complete eye exam. Vision is doing pretty well distance and near both eyes. No eye pain or discomfort in either eye.     Diabetic for about 15 years. Blood sugar has been doing pretty well.   Lab Results   Component Value Date    A1C 6.6 12/02/2019    A1C 6.6 06/18/2019    A1C 6.2 11/20/2018    A1C 6.3 08/03/2018    A1C 6.2 01/30/2018      Objective:  See Ophthalmology Exam.       Assessment:  Phyllis Aleman is a 79 year old female who presents with:   Encounter Diagnoses   Name Primary?     Examination of eyes and vision Yes     Myopia of left eye      Regular astigmatism of both eyes      Presbyopia        Early dry stage nonexudative age-related macular degeneration of both eyes Discussed AREDS2 eye vitamins     Type 2 diabetes mellitus without complication, without long-term current use of insulin (H)      Pseudophakia OU      Choroidal nevus of left eye      PVD (posterior vitreous detachment), bilateral        Plan:  Glasses prescription given     Keep blood sugars and blood pressure under good control.    Recommend taking an eye vitamin with AREDS2 on the packaging (like Preservision, iCaps, or generic). Follow dosing directions on the package.      Shirley Edwards MD  [phone]    Patient Education   Diabetes weakens the blood vessels all over the body, including the eyes. Damage to the blood vessels in the eyes can cause swelling or bleeding into part of the eye (called the retina). This is called diabetic retinopathy (CHARLA-tin-AH-puh-thee). If not treated, this disease can cause vision loss or blindness.   Symptoms may include blurred or distorted vision, but many people have no  symptoms. It's important to see your eye doctor regularly to check for problems.   Early treatment and good control can help protect your vision. Here are the things you can do to help prevent vision loss:      1. Keep your blood sugar levels under tight control.      2. Bring high blood pressure under control.      3. No smoking.      4. Have yearly dilated eye exams.           Again, thank you for allowing me to participate in the care of your patient.        Sincerely,        Shirley Edwards MD

## 2020-02-21 ENCOUNTER — TELEPHONE (OUTPATIENT)
Dept: OPHTHALMOLOGY | Facility: CLINIC | Age: 80
End: 2020-02-21

## 2020-02-21 NOTE — TELEPHONE ENCOUNTER
Patient was diagnosed with Macular Degeneration by Dr. Edwards. Phyllis would like a return phone call to discuss her condition. Please call [phone].

## 2020-03-17 PROBLEM — M25.561 RIGHT KNEE PAIN: Status: RESOLVED | Noted: 2020-01-03 | Resolved: 2020-03-17

## 2020-03-18 ENCOUNTER — TELEPHONE (OUTPATIENT)
Dept: FAMILY MEDICINE | Facility: CLINIC | Age: 80
End: 2020-03-18

## 2020-03-18 DIAGNOSIS — E11.9 TYPE 2 DIABETES MELLITUS WITHOUT COMPLICATION, WITHOUT LONG-TERM CURRENT USE OF INSULIN (H): ICD-10-CM

## 2020-03-18 NOTE — TELEPHONE ENCOUNTER
Reason for Call:  Other call back    Detailed comments: Patient has a question about her metformin. Please call to discuss.     Phone Number Patient can be reached at: Home number on file [phone] (home)    Best Time: anytime    Can we leave a detailed message on this number? YES    Call taken on 3/18/2020 at 3:24 PM by Sammi Sebastian

## 2020-03-18 NOTE — TELEPHONE ENCOUNTER
Sure:  Watch for GI Symptoms (Sometimes higher metformin doses can cause nausea or diarrhea)      If she is still wanting this, then reconcile med for orders update//to pharmacy    Thanks

## 2020-03-18 NOTE — TELEPHONE ENCOUNTER
RN spoke with patient. She is wondering if she can increase metformin to 2 tablets per day, instead of 1 tablet per day?     She states her blood sugars have been high recently, and thinks 2 tablets would help better control.     BS have been 130-145.     Pharmacy cued, if applicable.     Ella Omalley RN, BSN, PHN  [facility]: [place]

## 2020-03-19 NOTE — TELEPHONE ENCOUNTER
Patient returning call. Due to clinic meeting, RNs unavailable at time of call. Please call patient back.

## 2020-03-19 NOTE — TELEPHONE ENCOUNTER
RN called patient and relayed provider's message. Patient verbalized understanding.     Ella Omalley RN, BSN, PHN  [facility]: [place]

## 2020-03-19 NOTE — TELEPHONE ENCOUNTER
Attempt #1 to call patient.     Patient did not answer, RN left voicemail at home number. RN requested patient return call to Nurse Triage line at [phone].     Ella Omalley RN, BSN, PHN  [facility]: [place]

## 2020-05-01 ENCOUNTER — TRANSFERRED RECORDS (OUTPATIENT)
Dept: HEALTH INFORMATION MANAGEMENT | Facility: CLINIC | Age: 80
End: 2020-05-01

## 2020-05-19 DIAGNOSIS — D86.9 SARCOIDOSIS: ICD-10-CM

## 2020-05-19 DIAGNOSIS — E78.5 HYPERLIPIDEMIA LDL GOAL <100: Primary | ICD-10-CM

## 2020-05-19 DIAGNOSIS — I10 ESSENTIAL HYPERTENSION WITH GOAL BLOOD PRESSURE LESS THAN 140/90: ICD-10-CM

## 2020-05-19 DIAGNOSIS — G40.802 VISUAL EPILEPSY (H): ICD-10-CM

## 2020-05-19 DIAGNOSIS — E11.9 TYPE 2 DIABETES MELLITUS WITHOUT COMPLICATION, WITHOUT LONG-TERM CURRENT USE OF INSULIN (H): ICD-10-CM

## 2020-05-19 DIAGNOSIS — E55.9 VITAMIN D DEFICIENCY DISEASE: ICD-10-CM

## 2020-07-21 DIAGNOSIS — E78.5 HYPERLIPIDEMIA LDL GOAL <100: ICD-10-CM

## 2020-07-21 DIAGNOSIS — R60.0 EDEMA OF EXTREMITIES: ICD-10-CM

## 2020-07-21 NOTE — TELEPHONE ENCOUNTER
Routing refill request to provider for review/approval because:  Labs not current:  LDL    Please clarify what correct furosemide dose is and update MAR

## 2020-07-22 RX ORDER — FUROSEMIDE 40 MG
TABLET ORAL
Qty: 90 TABLET | Refills: 1 | Status: SHIPPED | OUTPATIENT
Start: 2020-07-22 | End: 2021-01-18

## 2020-07-22 RX ORDER — SIMVASTATIN 20 MG
TABLET ORAL
Qty: 90 TABLET | Refills: 3 | Status: SHIPPED | OUTPATIENT
Start: 2020-07-22

## 2020-10-19 DIAGNOSIS — E11.9 TYPE 2 DIABETES MELLITUS WITHOUT COMPLICATION, WITHOUT LONG-TERM CURRENT USE OF INSULIN (H): ICD-10-CM

## 2020-10-19 DIAGNOSIS — Z51.81 ENCOUNTER FOR THERAPEUTIC DRUG MONITORING: Primary | ICD-10-CM

## 2020-10-19 DIAGNOSIS — I10 ESSENTIAL HYPERTENSION WITH GOAL BLOOD PRESSURE LESS THAN 140/90: Chronic | ICD-10-CM

## 2020-10-19 RX ORDER — LOSARTAN POTASSIUM 50 MG/1
TABLET ORAL
Qty: 180 TABLET | Refills: 0 | Status: SHIPPED | OUTPATIENT
Start: 2020-10-19 | End: 2021-01-18

## 2020-11-03 DIAGNOSIS — E78.5 HYPERLIPIDEMIA LDL GOAL <100: ICD-10-CM

## 2020-11-03 DIAGNOSIS — I10 ESSENTIAL HYPERTENSION WITH GOAL BLOOD PRESSURE LESS THAN 140/90: Chronic | ICD-10-CM

## 2020-11-03 DIAGNOSIS — E11.9 TYPE 2 DIABETES MELLITUS WITHOUT COMPLICATION, WITHOUT LONG-TERM CURRENT USE OF INSULIN (H): ICD-10-CM

## 2020-11-03 DIAGNOSIS — Z51.81 ENCOUNTER FOR THERAPEUTIC DRUG MONITORING: ICD-10-CM

## 2020-11-03 LAB
ALT SERPL W P-5'-P-CCNC: 23 U/L (ref 0–50)
ANION GAP SERPL CALCULATED.3IONS-SCNC: 5 MMOL/L (ref 3–14)
BUN SERPL-MCNC: 17 MG/DL (ref 7–30)
CALCIUM SERPL-MCNC: 9.3 MG/DL (ref 8.5–10.1)
CHLORIDE SERPL-SCNC: 100 MMOL/L (ref 94–109)
CHOLEST SERPL-MCNC: 188 MG/DL
CO2 SERPL-SCNC: 29 MMOL/L (ref 20–32)
CREAT SERPL-MCNC: 0.92 MG/DL (ref 0.52–1.04)
ERYTHROCYTE [DISTWIDTH] IN BLOOD BY AUTOMATED COUNT: 14.8 % (ref 10–15)
GFR SERPL CREATININE-BSD FRML MDRD: 59 ML/MIN/{1.73_M2}
GLUCOSE SERPL-MCNC: 140 MG/DL (ref 70–99)
HBA1C MFR BLD: 6.3 % (ref 0–5.6)
HCT VFR BLD AUTO: 39.5 % (ref 35–47)
HDLC SERPL-MCNC: 62 MG/DL
HGB BLD-MCNC: 12.8 G/DL (ref 11.7–15.7)
LDLC SERPL CALC-MCNC: 108 MG/DL
MCH RBC QN AUTO: 29.3 PG (ref 26.5–33)
MCHC RBC AUTO-ENTMCNC: 32.4 G/DL (ref 31.5–36.5)
MCV RBC AUTO: 90 FL (ref 78–100)
NONHDLC SERPL-MCNC: 126 MG/DL
PLATELET # BLD AUTO: 319 10E9/L (ref 150–450)
POTASSIUM SERPL-SCNC: 4.7 MMOL/L (ref 3.4–5.3)
RBC # BLD AUTO: 4.37 10E12/L (ref 3.8–5.2)
SODIUM SERPL-SCNC: 134 MMOL/L (ref 133–144)
TRIGL SERPL-MCNC: 90 MG/DL
WBC # BLD AUTO: 8.2 10E9/L (ref 4–11)

## 2020-11-03 PROCEDURE — 36415 COLL VENOUS BLD VENIPUNCTURE: CPT | Performed by: INTERNAL MEDICINE

## 2020-11-03 PROCEDURE — 85027 COMPLETE CBC AUTOMATED: CPT | Performed by: INTERNAL MEDICINE

## 2020-11-03 PROCEDURE — 80061 LIPID PANEL: CPT | Performed by: INTERNAL MEDICINE

## 2020-11-03 PROCEDURE — 80048 BASIC METABOLIC PNL TOTAL CA: CPT | Performed by: INTERNAL MEDICINE

## 2020-11-03 PROCEDURE — 84460 ALANINE AMINO (ALT) (SGPT): CPT | Performed by: INTERNAL MEDICINE

## 2020-11-03 PROCEDURE — 83036 HEMOGLOBIN GLYCOSYLATED A1C: CPT | Performed by: INTERNAL MEDICINE

## 2020-11-03 NOTE — LETTER
[facility]   [address] [place]  [address]  [phone]                                   November 5, 2020    Phyllis Aleman  [address]  [facility] [address]        Dear Phyllis,      Please call  with any questions.  We can also discuss any questions regarding these labs at your next scheduled visit.     Results for orders placed or performed in visit on 11/03/20   **ALT FUTURE anytime     Status: None   Result Value Ref Range    ALT 23 0 - 50 U/L   CBC with platelets     Status: None   Result Value Ref Range    WBC 8.2 4.0 - 11.0 10e9/L    RBC Count 4.37 3.8 - 5.2 10e12/L    Hemoglobin 12.8 11.7 - 15.7 g/dL    Hematocrit 39.5 35.0 - 47.0 %    MCV 90 78 - 100 fl    MCH 29.3 26.5 - 33.0 pg    MCHC 32.4 31.5 - 36.5 g/dL    RDW 14.8 10.0 - 15.0 %    Platelet Count 319 150 - 450 10e9/L   Basic metabolic panel     Status: Abnormal   Result Value Ref Range    Sodium 134 133 - 144 mmol/L    Potassium 4.7 3.4 - 5.3 mmol/L    Chloride 100 94 - 109 mmol/L    Carbon Dioxide 29 20 - 32 mmol/L    Anion Gap 5 3 - 14 mmol/L    Glucose 140 (H) 70 - 99 mg/dL    Urea Nitrogen 17 7 - 30 mg/dL    Creatinine 0.92 0.52 - 1.04 mg/dL    GFR Estimate 59 (L) >60 mL/min/[1.73_m2]    GFR Estimate If Black 68 >60 mL/min/[1.73_m2]    Calcium 9.3 8.5 - 10.1 mg/dL   Lipid panel reflex to direct LDL Fasting     Status: Abnormal   Result Value Ref Range    Cholesterol 188 <200 mg/dL    Triglycerides 90 <150 mg/dL    HDL Cholesterol 62 >49 mg/dL    LDL Cholesterol Calculated 108 (H) <100 mg/dL    Non HDL Cholesterol 126 <130 mg/dL   Hemoglobin A1c     Status: Abnormal   Result Value Ref Range    Hemoglobin A1C 6.3 (H) 0 - 5.6 %       If you have any questions please call the clinic at [phone]    Sincerely,    Johana Pelayo MD   bmd

## 2020-11-09 ENCOUNTER — TRANSFERRED RECORDS (OUTPATIENT)
Dept: HEALTH INFORMATION MANAGEMENT | Facility: CLINIC | Age: 80
End: 2020-11-09

## 2020-12-10 ENCOUNTER — TELEPHONE (OUTPATIENT)
Dept: FAMILY MEDICINE | Facility: CLINIC | Age: 80
End: 2020-12-10

## 2020-12-10 NOTE — TELEPHONE ENCOUNTER
"I see mammogram order in Epic dated 10/6/20.    Patient last saw Dr. Pelayo 12/5/19.    I see patient has cancelled several recent mammograms.    I called patient, she says she cancelled her mammogram appointment because \"the man who was scheduling it told me I needed an order or else insurance would not cover it\".    I advised her there is an order in.   She should call her insurance to see if they cover 3D and can get that or the regular imaging done depending on her preference.    Patient verbalized understanding of and agreement with plan.      Brigida Perez RN  [facility]      "

## 2020-12-10 NOTE — TELEPHONE ENCOUNTER
Reason for Call:  Other call back    Detailed comments: Patient has questions about a mammogram she needs to get.    Phone Number Patient can be reached at: Cell number on file:    Telephone Information:   Mobile [phone]       Best Time: na    Can we leave a detailed message on this number? YES    Call taken on 12/10/2020 at 8:23 AM by Jeannine Walter

## 2021-01-17 DIAGNOSIS — I10 ESSENTIAL HYPERTENSION WITH GOAL BLOOD PRESSURE LESS THAN 140/90: Chronic | ICD-10-CM

## 2021-01-17 DIAGNOSIS — R60.0 EDEMA OF EXTREMITIES: ICD-10-CM

## 2021-01-17 DIAGNOSIS — E11.9 TYPE 2 DIABETES MELLITUS WITHOUT COMPLICATION, WITHOUT LONG-TERM CURRENT USE OF INSULIN (H): ICD-10-CM

## 2021-01-18 RX ORDER — FUROSEMIDE 40 MG
TABLET ORAL
Qty: 90 TABLET | Refills: 3 | Status: SHIPPED | OUTPATIENT
Start: 2021-01-18

## 2021-01-18 RX ORDER — LOSARTAN POTASSIUM 50 MG/1
TABLET ORAL
Qty: 180 TABLET | Refills: 3 | Status: SHIPPED | OUTPATIENT
Start: 2021-01-18

## 2021-01-25 ENCOUNTER — TRANSFERRED RECORDS (OUTPATIENT)
Dept: HEALTH INFORMATION MANAGEMENT | Facility: CLINIC | Age: 81
End: 2021-01-25

## 2021-01-26 ENCOUNTER — ANCILLARY PROCEDURE (OUTPATIENT)
Dept: MAMMOGRAPHY | Facility: CLINIC | Age: 81
End: 2021-01-26
Attending: INTERNAL MEDICINE
Payer: COMMERCIAL

## 2021-01-26 DIAGNOSIS — Z12.31 VISIT FOR SCREENING MAMMOGRAM: ICD-10-CM

## 2021-01-26 PROCEDURE — 77067 SCR MAMMO BI INCL CAD: CPT | Mod: TC | Performed by: RADIOLOGY

## 2021-02-19 ENCOUNTER — OFFICE VISIT (OUTPATIENT)
Dept: OPHTHALMOLOGY | Facility: CLINIC | Age: 81
End: 2021-02-19
Payer: COMMERCIAL

## 2021-02-19 DIAGNOSIS — H52.223 MYOPIA OF BOTH EYES WITH REGULAR ASTIGMATISM AND PRESBYOPIA: ICD-10-CM

## 2021-02-19 DIAGNOSIS — H35.3131 EARLY DRY STAGE NONEXUDATIVE AGE-RELATED MACULAR DEGENERATION OF BOTH EYES: ICD-10-CM

## 2021-02-19 DIAGNOSIS — Z01.00 EXAMINATION OF EYES AND VISION: Primary | ICD-10-CM

## 2021-02-19 DIAGNOSIS — D31.32 CHOROIDAL NEVUS OF LEFT EYE: ICD-10-CM

## 2021-02-19 DIAGNOSIS — H52.4 MYOPIA OF BOTH EYES WITH REGULAR ASTIGMATISM AND PRESBYOPIA: ICD-10-CM

## 2021-02-19 DIAGNOSIS — Z96.1 PSEUDOPHAKIA: ICD-10-CM

## 2021-02-19 DIAGNOSIS — H52.13 MYOPIA OF BOTH EYES WITH REGULAR ASTIGMATISM AND PRESBYOPIA: ICD-10-CM

## 2021-02-19 DIAGNOSIS — H43.813 PVD (POSTERIOR VITREOUS DETACHMENT), BILATERAL: ICD-10-CM

## 2021-02-19 DIAGNOSIS — E11.9 TYPE 2 DIABETES MELLITUS WITHOUT COMPLICATION, WITHOUT LONG-TERM CURRENT USE OF INSULIN (H): ICD-10-CM

## 2021-02-19 PROCEDURE — 92014 COMPRE OPH EXAM EST PT 1/>: CPT | Performed by: STUDENT IN AN ORGANIZED HEALTH CARE EDUCATION/TRAINING PROGRAM

## 2021-02-19 PROCEDURE — 92015 DETERMINE REFRACTIVE STATE: CPT | Performed by: STUDENT IN AN ORGANIZED HEALTH CARE EDUCATION/TRAINING PROGRAM

## 2021-02-19 ASSESSMENT — SLIT LAMP EXAM - LIDS
COMMENTS: 1+ DERMATOCHALASIS
COMMENTS: 1+ DERMATOCHALASIS

## 2021-02-19 ASSESSMENT — REFRACTION_MANIFEST
OD_ADD: +2.50
OD_SPHERE: -0.25
OS_ADD: +2.50
OD_CYLINDER: +1.00
OS_CYLINDER: +2.00
OS_AXIS: 165
OD_AXIS: 165
OS_SPHERE: -0.50

## 2021-02-19 ASSESSMENT — VISUAL ACUITY
OD_CC: 20/25
OD_CC+: -3
METHOD: SNELLEN - LINEAR
CORRECTION_TYPE: GLASSES
OS_CC: 20/30

## 2021-02-19 ASSESSMENT — REFRACTION_WEARINGRX
OS_ADD: +2.50
OD_CYLINDER: +0.50
SPECS_TYPE: PAL
OS_SPHERE: -0.25
OS_CYLINDER: +1.25
OD_AXIS: 022
OD_SPHERE: PLANO
OD_ADD: +2.50
OS_AXIS: 176

## 2021-02-19 ASSESSMENT — EXTERNAL EXAM - LEFT EYE: OS_EXAM: NORMAL

## 2021-02-19 ASSESSMENT — CONF VISUAL FIELD
METHOD: COUNTING FINGERS
OD_NORMAL: 1
OS_NORMAL: 1

## 2021-02-19 ASSESSMENT — TONOMETRY
IOP_METHOD: APPLANATION
OD_IOP_MMHG: 18
OS_IOP_MMHG: 19

## 2021-02-19 ASSESSMENT — CUP TO DISC RATIO
OD_RATIO: 0.35
OS_RATIO: 0.3

## 2021-02-19 ASSESSMENT — EXTERNAL EXAM - RIGHT EYE: OD_EXAM: NORMAL

## 2021-02-19 NOTE — PROGRESS NOTES
" Current Eye Medications:  Eye vit. 2 tablets daily     Subjective:  Complete eye exam. Vision is doing well both eyes in distance, just not as sharp as it used to be. Vision is doing well at near both eyes. No eye pain or discomfort in either eye.     Diabetic about 16 years. Blood sugar has been doing well, but runs high in the morning.   Lab Results   Component Value Date    A1C 6.3 11/03/2020    A1C 6.6 12/02/2019    A1C 6.6 06/18/2019    A1C 6.2 11/20/2018    A1C 6.3 08/03/2018      Objective:  See Ophthalmology Exam.       Assessment:  Phyllis Aleman is a 80 year old female who presents with:   Encounter Diagnoses   Name Primary?     Examination of eyes and vision      Myopia of both eyes with regular astigmatism and presbyopia        Type 2 diabetes mellitus without complication, without long-term current use of insulin (H) Negative diabetic retinopathy      Early dry stage nonexudative age-related macular degeneration of both eyes Stable, mild, on AREDS2.     Pseudophakia OU      Choroidal nevus of left eye      PVD (posterior vitreous detachment), bilateral        Plan:  Continue AREDS2 eye vitamins by mouth    Use artificial tears up to four times a day (like Refresh Optive, Systane Balance, TheraTears, or generic artificial tears are ok. Avoid \"get the red out\" drops).     Glasses prescription given - optional to update    Keep blood sugars and blood pressure under good control.    Shirley Edwards MD  [phone]    Patient Education   Diabetes weakens the blood vessels all over the body, including the eyes. Damage to the blood vessels in the eyes can cause swelling or bleeding into part of the eye (called the retina). This is called diabetic retinopathy (CHARLA-tin-AH-puh-thee). If not treated, this disease can cause vision loss or blindness.   Symptoms may include blurred or distorted vision, but many people have no symptoms. It's important to see your eye doctor regularly to check for problems. "   Early treatment and good control can help protect your vision. Here are the things you can do to help prevent vision loss:      1. Keep your blood sugar levels under tight control.      2. Bring high blood pressure under control.      3. No smoking.      4. Have yearly dilated eye exams.

## 2021-02-19 NOTE — PATIENT INSTRUCTIONS
"Continue AREDS2 eye vitamins by mouth    Use artificial tears up to four times a day (like Refresh Optive, Systane Balance, TheraTears, or generic artificial tears are ok. Avoid \"get the red out\" drops).     Glasses prescription given - optional to update    Keep blood sugars and blood pressure under good control.    Shirley Edwards MD  [phone]    Patient Education   Diabetes weakens the blood vessels all over the body, including the eyes. Damage to the blood vessels in the eyes can cause swelling or bleeding into part of the eye (called the retina). This is called diabetic retinopathy (CHARLA-tin-AH-puh-thee). If not treated, this disease can cause vision loss or blindness.   Symptoms may include blurred or distorted vision, but many people have no symptoms. It's important to see your eye doctor regularly to check for problems.   Early treatment and good control can help protect your vision. Here are the things you can do to help prevent vision loss:      1. Keep your blood sugar levels under tight control.      2. Bring high blood pressure under control.      3. No smoking.      4. Have yearly dilated eye exams.       "

## 2021-02-19 NOTE — LETTER
"    2/19/2021         RE: Phyllis Aleman  [address]  [facility] [address]        Dear Colleague,    Thank you for referring your patient, Phyllis Aleman, to the [facility]. Please see a copy of my visit note below.     Current Eye Medications:  Eye vit. 2 tablets daily     Subjective:  Complete eye exam. Vision is doing well both eyes in distance, just not as sharp as it used to be. Vision is doing well at near both eyes. No eye pain or discomfort in either eye.     Diabetic about 16 years. Blood sugar has been doing well, but runs high in the morning.   Lab Results   Component Value Date    A1C 6.3 11/03/2020    A1C 6.6 12/02/2019    A1C 6.6 06/18/2019    A1C 6.2 11/20/2018    A1C 6.3 08/03/2018      Objective:  See Ophthalmology Exam.       Assessment:  Phyllis Aleman is a 80 year old female who presents with:   Encounter Diagnoses   Name Primary?     Examination of eyes and vision      Myopia of both eyes with regular astigmatism and presbyopia        Type 2 diabetes mellitus without complication, without long-term current use of insulin (H) Negative diabetic retinopathy      Early dry stage nonexudative age-related macular degeneration of both eyes Stable, mild, on AREDS2.     Pseudophakia OU      Choroidal nevus of left eye      PVD (posterior vitreous detachment), bilateral        Plan:  Continue AREDS2 eye vitamins by mouth    Use artificial tears up to four times a day (like Refresh Optive, Systane Balance, TheraTears, or generic artificial tears are ok. Avoid \"get the red out\" drops).     Glasses prescription given - optional to update    Keep blood sugars and blood pressure under good control.    Shirley Edwards MD  [phone]    Patient Education   Diabetes weakens the blood vessels all over the body, including the eyes. Damage to the blood vessels in the eyes can cause swelling or bleeding into part of the eye (called the retina). This " is called diabetic retinopathy (CHARLA-tin-AH-puh-thee). If not treated, this disease can cause vision loss or blindness.   Symptoms may include blurred or distorted vision, but many people have no symptoms. It's important to see your eye doctor regularly to check for problems.   Early treatment and good control can help protect your vision. Here are the things you can do to help prevent vision loss:      1. Keep your blood sugar levels under tight control.      2. Bring high blood pressure under control.      3. No smoking.      4. Have yearly dilated eye exams.             Again, thank you for allowing me to participate in the care of your patient.        Sincerely,        Shirley Edwards MD

## 2021-03-21 DIAGNOSIS — E11.9 TYPE 2 DIABETES MELLITUS WITHOUT COMPLICATION, WITHOUT LONG-TERM CURRENT USE OF INSULIN (H): ICD-10-CM

## 2021-07-16 DIAGNOSIS — E78.5 HYPERLIPIDEMIA LDL GOAL <100: ICD-10-CM

## 2021-07-17 RX ORDER — SIMVASTATIN 20 MG
TABLET ORAL
Qty: 90 TABLET | Refills: 3 | OUTPATIENT
Start: 2021-07-17

## 2022-11-28 NOTE — NURSING NOTE
"Chief Complaint   Patient presents with     Physical     Health Maintenance     colon,fall risk,labs        Initial /68 (BP Location: Left arm, Patient Position: Chair, Cuff Size: Adult Large)  Pulse 87  Temp 97.2  F (36.2  C) (Oral)  Ht 5' 7.5\" (1.715 m)  Wt 227 lb (103 kg)  SpO2 94%  BMI 35.03 kg/m2 Estimated body mass index is 35.03 kg/(m^2) as calculated from the following:    Height as of this encounter: 5' 7.5\" (1.715 m).    Weight as of this encounter: 227 lb (103 kg).  Medication Reconciliation: complete   Jeanette Holly ma      "
Female